# Patient Record
Sex: MALE | Race: WHITE | Employment: FULL TIME
[De-identification: names, ages, dates, MRNs, and addresses within clinical notes are randomized per-mention and may not be internally consistent; named-entity substitution may affect disease eponyms.]

---

## 2017-01-06 ENCOUNTER — OFFICE VISIT (OUTPATIENT)
Dept: FAMILY MEDICINE CLINIC | Facility: CLINIC | Age: 37
End: 2017-01-06

## 2017-01-06 VITALS
SYSTOLIC BLOOD PRESSURE: 130 MMHG | WEIGHT: 169 LBS | TEMPERATURE: 98 F | BODY MASS INDEX: 23.66 KG/M2 | HEIGHT: 71 IN | OXYGEN SATURATION: 97 % | HEART RATE: 100 BPM | DIASTOLIC BLOOD PRESSURE: 74 MMHG

## 2017-01-06 DIAGNOSIS — F51.01 PRIMARY INSOMNIA: ICD-10-CM

## 2017-01-06 DIAGNOSIS — F19.21 DRUG ADDICTION IN REMISSION (HCC): ICD-10-CM

## 2017-01-06 DIAGNOSIS — F41.1 GENERALIZED ANXIETY DISORDER: Primary | ICD-10-CM

## 2017-01-06 PROCEDURE — 99214 OFFICE O/P EST MOD 30 MIN: CPT | Performed by: NURSE PRACTITIONER

## 2017-01-06 RX ORDER — GABAPENTIN 800 MG/1
TABLET ORAL
Qty: 120 TABLET | Refills: 0 | Status: SHIPPED | OUTPATIENT
Start: 2017-01-06 | End: 2017-02-03 | Stop reason: SDUPTHER

## 2017-02-03 ENCOUNTER — OFFICE VISIT (OUTPATIENT)
Dept: FAMILY MEDICINE CLINIC | Facility: CLINIC | Age: 37
End: 2017-02-03

## 2017-02-03 VITALS
TEMPERATURE: 98.1 F | SYSTOLIC BLOOD PRESSURE: 122 MMHG | HEART RATE: 96 BPM | OXYGEN SATURATION: 98 % | HEIGHT: 71 IN | WEIGHT: 168 LBS | BODY MASS INDEX: 23.52 KG/M2 | DIASTOLIC BLOOD PRESSURE: 70 MMHG

## 2017-02-03 DIAGNOSIS — F41.1 GENERALIZED ANXIETY DISORDER: Primary | ICD-10-CM

## 2017-02-03 DIAGNOSIS — R36.9 DISCHARGE FROM PENIS: ICD-10-CM

## 2017-02-03 PROCEDURE — 99214 OFFICE O/P EST MOD 30 MIN: CPT | Performed by: NURSE PRACTITIONER

## 2017-02-03 RX ORDER — BUPRENORPHINE AND NALOXONE 8; 2 MG/1; MG/1
8 FILM, SOLUBLE BUCCAL; SUBLINGUAL 2 TIMES DAILY
COMMUNITY
End: 2017-06-15

## 2017-02-03 RX ORDER — GABAPENTIN 800 MG/1
TABLET ORAL
Qty: 120 TABLET | Refills: 0 | Status: SHIPPED | OUTPATIENT
Start: 2017-02-03 | End: 2017-03-01 | Stop reason: SDUPTHER

## 2017-02-06 RX ORDER — AZITHROMYCIN 500 MG/1
1000 TABLET, FILM COATED ORAL DAILY
Qty: 2 TABLET | Refills: 0 | Status: SHIPPED | OUTPATIENT
Start: 2017-02-06 | End: 2017-02-07 | Stop reason: ALTCHOICE

## 2017-03-01 ENCOUNTER — OFFICE VISIT (OUTPATIENT)
Dept: FAMILY MEDICINE CLINIC | Facility: CLINIC | Age: 37
End: 2017-03-01

## 2017-03-01 VITALS
HEIGHT: 71 IN | DIASTOLIC BLOOD PRESSURE: 60 MMHG | BODY MASS INDEX: 23.52 KG/M2 | WEIGHT: 168 LBS | HEART RATE: 78 BPM | SYSTOLIC BLOOD PRESSURE: 102 MMHG | OXYGEN SATURATION: 97 % | TEMPERATURE: 98.3 F

## 2017-03-01 DIAGNOSIS — F41.1 GENERALIZED ANXIETY DISORDER: Primary | ICD-10-CM

## 2017-03-01 DIAGNOSIS — F19.20 DRUG ABUSE AND DEPENDENCE (HCC): ICD-10-CM

## 2017-03-01 PROCEDURE — 99214 OFFICE O/P EST MOD 30 MIN: CPT | Performed by: NURSE PRACTITIONER

## 2017-03-01 RX ORDER — GABAPENTIN 800 MG/1
TABLET ORAL
Qty: 120 TABLET | Refills: 2 | Status: SHIPPED | OUTPATIENT
Start: 2017-03-01 | End: 2017-06-15 | Stop reason: SDUPTHER

## 2017-03-01 RX ORDER — CITALOPRAM 20 MG/1
20 TABLET ORAL DAILY
Qty: 30 TABLET | Refills: 3 | Status: SHIPPED | OUTPATIENT
Start: 2017-03-01 | End: 2017-04-04 | Stop reason: SDUPTHER

## 2017-03-01 ASSESSMENT — ENCOUNTER SYMPTOMS
VOMITING: 0
NAUSEA: 0
DIARRHEA: 0

## 2017-03-07 ENCOUNTER — TELEPHONE (OUTPATIENT)
Dept: FAMILY MEDICINE CLINIC | Facility: CLINIC | Age: 37
End: 2017-03-07

## 2017-03-07 ENCOUNTER — NURSE ONLY (OUTPATIENT)
Dept: FAMILY MEDICINE CLINIC | Facility: CLINIC | Age: 37
End: 2017-03-07

## 2017-03-07 DIAGNOSIS — Z20.2 EXPOSURE TO STD: Primary | ICD-10-CM

## 2017-03-07 DIAGNOSIS — R36.9 PENILE DISCHARGE: ICD-10-CM

## 2017-03-08 RX ORDER — SULFAMETHOXAZOLE AND TRIMETHOPRIM 800; 160 MG/1; MG/1
1 TABLET ORAL 2 TIMES DAILY
Qty: 6 TABLET | Refills: 0 | Status: SHIPPED | OUTPATIENT
Start: 2017-03-08 | End: 2017-03-11

## 2017-03-09 RX ORDER — AZITHROMYCIN 500 MG/1
1000 TABLET, FILM COATED ORAL DAILY
Qty: 2 TABLET | Refills: 0 | Status: SHIPPED | OUTPATIENT
Start: 2017-03-09 | End: 2017-03-10

## 2017-04-04 ENCOUNTER — OFFICE VISIT (OUTPATIENT)
Dept: FAMILY MEDICINE CLINIC | Age: 37
End: 2017-04-04
Payer: MEDICAID

## 2017-04-04 VITALS
OXYGEN SATURATION: 98 % | DIASTOLIC BLOOD PRESSURE: 70 MMHG | HEART RATE: 76 BPM | BODY MASS INDEX: 24.22 KG/M2 | WEIGHT: 173 LBS | HEIGHT: 71 IN | TEMPERATURE: 98.7 F | SYSTOLIC BLOOD PRESSURE: 116 MMHG

## 2017-04-04 DIAGNOSIS — F41.9 ANXIETY AND DEPRESSION: Primary | ICD-10-CM

## 2017-04-04 DIAGNOSIS — F32.A ANXIETY AND DEPRESSION: Primary | ICD-10-CM

## 2017-04-04 DIAGNOSIS — F51.04 PSYCHOPHYSIOLOGICAL INSOMNIA: ICD-10-CM

## 2017-04-04 DIAGNOSIS — F19.20 DRUG ABUSE AND DEPENDENCE (HCC): ICD-10-CM

## 2017-04-04 PROCEDURE — 99214 OFFICE O/P EST MOD 30 MIN: CPT | Performed by: NURSE PRACTITIONER

## 2017-04-04 RX ORDER — CITALOPRAM 40 MG/1
40 TABLET ORAL DAILY
Qty: 30 TABLET | Refills: 0 | Status: SHIPPED | OUTPATIENT
Start: 2017-04-04 | End: 2017-06-15 | Stop reason: SDUPTHER

## 2017-04-04 RX ORDER — HYDROXYZINE 50 MG/1
50 TABLET, FILM COATED ORAL NIGHTLY PRN
Qty: 30 TABLET | Refills: 0 | Status: SHIPPED | OUTPATIENT
Start: 2017-04-04 | End: 2017-05-11 | Stop reason: SDUPTHER

## 2017-05-10 ENCOUNTER — TELEPHONE (OUTPATIENT)
Dept: FAMILY MEDICINE CLINIC | Age: 37
End: 2017-05-10

## 2017-05-11 RX ORDER — HYDROXYZINE 50 MG/1
50 TABLET, FILM COATED ORAL NIGHTLY PRN
Qty: 30 TABLET | Refills: 0 | Status: SHIPPED | OUTPATIENT
Start: 2017-05-11 | End: 2017-05-21

## 2017-06-15 ENCOUNTER — OFFICE VISIT (OUTPATIENT)
Dept: FAMILY MEDICINE CLINIC | Age: 37
End: 2017-06-15
Payer: MEDICAID

## 2017-06-15 VITALS
OXYGEN SATURATION: 98 % | SYSTOLIC BLOOD PRESSURE: 124 MMHG | DIASTOLIC BLOOD PRESSURE: 80 MMHG | HEART RATE: 88 BPM | TEMPERATURE: 99 F | BODY MASS INDEX: 23.71 KG/M2 | WEIGHT: 170 LBS

## 2017-06-15 DIAGNOSIS — F41.1 GENERALIZED ANXIETY DISORDER: Primary | ICD-10-CM

## 2017-06-15 DIAGNOSIS — G25.81 RLS (RESTLESS LEGS SYNDROME): ICD-10-CM

## 2017-06-15 PROCEDURE — 99214 OFFICE O/P EST MOD 30 MIN: CPT | Performed by: NURSE PRACTITIONER

## 2017-06-15 RX ORDER — GABAPENTIN 800 MG/1
TABLET ORAL
Qty: 120 TABLET | Refills: 3 | Status: SHIPPED | OUTPATIENT
Start: 2017-06-15 | End: 2017-09-08

## 2017-06-15 RX ORDER — CITALOPRAM 40 MG/1
40 TABLET ORAL DAILY
Qty: 30 TABLET | Refills: 3 | Status: SHIPPED | OUTPATIENT
Start: 2017-06-15 | End: 2017-12-02 | Stop reason: ALTCHOICE

## 2017-06-15 ASSESSMENT — PATIENT HEALTH QUESTIONNAIRE - PHQ9
2. FEELING DOWN, DEPRESSED OR HOPELESS: 0
SUM OF ALL RESPONSES TO PHQ9 QUESTIONS 1 & 2: 1
1. LITTLE INTEREST OR PLEASURE IN DOING THINGS: 1
SUM OF ALL RESPONSES TO PHQ QUESTIONS 1-9: 1

## 2017-06-15 ASSESSMENT — ENCOUNTER SYMPTOMS
COUGH: 1
SPUTUM PRODUCTION: 0

## 2017-08-01 ENCOUNTER — HOSPITAL ENCOUNTER (EMERGENCY)
Age: 37
Discharge: HOME | End: 2017-08-01
Attending: EMERGENCY MEDICINE
Payer: MEDICAID

## 2017-08-01 VITALS
RESPIRATION RATE: 16 BRPM | HEART RATE: 76 BPM | DIASTOLIC BLOOD PRESSURE: 76 MMHG | TEMPERATURE: 98.7 F | OXYGEN SATURATION: 100 % | SYSTOLIC BLOOD PRESSURE: 121 MMHG

## 2017-08-01 DIAGNOSIS — T40.2X1A OPIOID OVERDOSE, ACCIDENTAL OR UNINTENTIONAL, INITIAL ENCOUNTER (HCC): Primary | ICD-10-CM

## 2017-08-01 LAB
ABSOLUTE EOS #: 0.1 K/UL (ref 0–0.4)
ABSOLUTE LYMPH #: 3.2 K/UL (ref 0.9–2.5)
ABSOLUTE MONO #: 0.7 K/UL (ref 0–1)
ALBUMIN SERPL-MCNC: 4.1 G/DL (ref 3.5–5.2)
ALBUMIN/GLOBULIN RATIO: ABNORMAL (ref 1–2.5)
ALP BLD-CCNC: 77 U/L (ref 40–129)
ALT SERPL-CCNC: 60 U/L (ref 5–41)
ANION GAP SERPL CALCULATED.3IONS-SCNC: 12 MMOL/L (ref 9–17)
AST SERPL-CCNC: 61 U/L
BASOPHILS # BLD: 1 %
BASOPHILS ABSOLUTE: 0.1 K/UL (ref 0–0.2)
BILIRUB SERPL-MCNC: 0.5 MG/DL (ref 0.3–1.2)
BUN BLDV-MCNC: 16 MG/DL (ref 6–20)
BUN/CREAT BLD: 20 (ref 9–20)
CALCIUM SERPL-MCNC: 9.1 MG/DL (ref 8.6–10.4)
CHLORIDE BLD-SCNC: 102 MMOL/L (ref 98–107)
CO2: 25 MMOL/L (ref 20–31)
CREAT SERPL-MCNC: 0.82 MG/DL (ref 0.7–1.2)
DIFFERENTIAL TYPE: YES
EOSINOPHILS RELATIVE PERCENT: 1 %
ETHANOL PERCENT: <0.01 %
ETHANOL: <10 MG/DL
GFR AFRICAN AMERICAN: >60 ML/MIN
GFR NON-AFRICAN AMERICAN: >60 ML/MIN
GFR SERPL CREATININE-BSD FRML MDRD: ABNORMAL ML/MIN/{1.73_M2}
GFR SERPL CREATININE-BSD FRML MDRD: ABNORMAL ML/MIN/{1.73_M2}
GLUCOSE BLD-MCNC: 97 MG/DL (ref 70–99)
HCT VFR BLD CALC: 40.9 % (ref 41–53)
HEMOGLOBIN: 13.7 G/DL (ref 13.5–17.5)
LYMPHOCYTES # BLD: 34 %
MCH RBC QN AUTO: 28 PG (ref 26–34)
MCHC RBC AUTO-ENTMCNC: 33.5 G/DL (ref 31–37)
MCV RBC AUTO: 83.4 FL (ref 80–100)
MONOCYTES # BLD: 8 %
PDW BLD-RTO: 13.9 % (ref 12.1–15.2)
PLATELET # BLD: 113 K/UL (ref 140–450)
PLATELET ESTIMATE: ABNORMAL
PMV BLD AUTO: ABNORMAL FL (ref 6–12)
POTASSIUM SERPL-SCNC: 3.5 MMOL/L (ref 3.7–5.3)
RBC # BLD: 4.9 M/UL (ref 4.5–5.9)
RBC # BLD: ABNORMAL 10*6/UL
SEG NEUTROPHILS: 56 %
SEGMENTED NEUTROPHILS ABSOLUTE COUNT: 5.2 K/UL (ref 2.1–6.5)
SODIUM BLD-SCNC: 139 MMOL/L (ref 135–144)
TOTAL PROTEIN: 7 G/DL (ref 6.4–8.3)
WBC # BLD: 9.3 K/UL (ref 3.5–11)
WBC # BLD: ABNORMAL 10*3/UL

## 2017-08-01 PROCEDURE — G0480 DRUG TEST DEF 1-7 CLASSES: HCPCS

## 2017-08-01 PROCEDURE — 36415 COLL VENOUS BLD VENIPUNCTURE: CPT

## 2017-08-01 PROCEDURE — 99285 EMERGENCY DEPT VISIT HI MDM: CPT

## 2017-08-01 PROCEDURE — 80053 COMPREHEN METABOLIC PANEL: CPT

## 2017-08-01 PROCEDURE — 96374 THER/PROPH/DIAG INJ IV PUSH: CPT

## 2017-08-01 PROCEDURE — 93005 ELECTROCARDIOGRAM TRACING: CPT

## 2017-08-01 PROCEDURE — 6360000002 HC RX W HCPCS

## 2017-08-01 PROCEDURE — 6360000002 HC RX W HCPCS: Performed by: EMERGENCY MEDICINE

## 2017-08-01 PROCEDURE — 96376 TX/PRO/DX INJ SAME DRUG ADON: CPT

## 2017-08-01 PROCEDURE — 2580000003 HC RX 258: Performed by: EMERGENCY MEDICINE

## 2017-08-01 PROCEDURE — 85025 COMPLETE CBC W/AUTO DIFF WBC: CPT

## 2017-08-01 RX ORDER — NALOXONE HYDROCHLORIDE 1 MG/ML
INJECTION INTRAMUSCULAR; INTRAVENOUS; SUBCUTANEOUS
Status: COMPLETED
Start: 2017-08-01 | End: 2017-08-01

## 2017-08-01 RX ORDER — NALOXONE HYDROCHLORIDE 1 MG/ML
0.4 INJECTION INTRAMUSCULAR; INTRAVENOUS; SUBCUTANEOUS ONCE
Status: COMPLETED | OUTPATIENT
Start: 2017-08-01 | End: 2017-08-01

## 2017-08-01 RX ORDER — NALOXONE HYDROCHLORIDE 1 MG/ML
1 INJECTION INTRAMUSCULAR; INTRAVENOUS; SUBCUTANEOUS ONCE
Status: COMPLETED | OUTPATIENT
Start: 2017-08-01 | End: 2017-08-01

## 2017-08-01 RX ORDER — NALOXONE HYDROCHLORIDE 1 MG/ML
2 INJECTION INTRAMUSCULAR; INTRAVENOUS; SUBCUTANEOUS ONCE
Status: COMPLETED | OUTPATIENT
Start: 2017-08-01 | End: 2017-08-01

## 2017-08-01 RX ORDER — 0.9 % SODIUM CHLORIDE 0.9 %
1000 INTRAVENOUS SOLUTION INTRAVENOUS ONCE
Status: COMPLETED | OUTPATIENT
Start: 2017-08-01 | End: 2017-08-01

## 2017-08-01 RX ADMIN — NALOXONE HYDROCHLORIDE 1 MG: 1 INJECTION INTRAMUSCULAR; INTRAVENOUS; SUBCUTANEOUS at 08:09

## 2017-08-01 RX ADMIN — NALOXONE HYDROCHLORIDE 1 MG: 1 INJECTION PARENTERAL at 08:09

## 2017-08-01 RX ADMIN — NALOXONE HYDROCHLORIDE 2 MG: 1 INJECTION PARENTERAL at 10:32

## 2017-08-01 RX ADMIN — NALOXONE HYDROCHLORIDE: 1 INJECTION PARENTERAL at 08:16

## 2017-08-01 RX ADMIN — SODIUM CHLORIDE 1000 ML: 9 INJECTION, SOLUTION INTRAVENOUS at 08:27

## 2017-08-01 RX ADMIN — NALOXONE HYDROCHLORIDE 2 MG: 1 INJECTION INTRAMUSCULAR; INTRAVENOUS; SUBCUTANEOUS at 10:32

## 2017-08-01 RX ADMIN — NALOXONE HYDROCHLORIDE 0.4 MG: 1 INJECTION PARENTERAL at 08:20

## 2017-08-01 RX ADMIN — NALOXONE HYDROCHLORIDE 1 MG: 1 INJECTION PARENTERAL at 08:57

## 2017-08-01 RX ADMIN — NALOXONE HYDROCHLORIDE: 1 INJECTION INTRAMUSCULAR; INTRAVENOUS; SUBCUTANEOUS at 08:16

## 2017-08-02 LAB
EKG ATRIAL RATE: 80 BPM
EKG P AXIS: 62 DEGREES
EKG P-R INTERVAL: 148 MS
EKG Q-T INTERVAL: 390 MS
EKG QRS DURATION: 102 MS
EKG QTC CALCULATION (BAZETT): 449 MS
EKG R AXIS: 31 DEGREES
EKG T AXIS: 47 DEGREES
EKG VENTRICULAR RATE: 80 BPM

## 2017-08-10 ENCOUNTER — TELEPHONE (OUTPATIENT)
Dept: FAMILY MEDICINE CLINIC | Age: 37
End: 2017-08-10

## 2017-08-18 ENCOUNTER — TELEPHONE (OUTPATIENT)
Dept: FAMILY MEDICINE CLINIC | Age: 37
End: 2017-08-18

## 2017-08-19 ENCOUNTER — HOSPITAL ENCOUNTER (EMERGENCY)
Age: 37
Discharge: HOME | End: 2017-08-19
Attending: FAMILY MEDICINE
Payer: MEDICAID

## 2017-08-19 VITALS
SYSTOLIC BLOOD PRESSURE: 109 MMHG | TEMPERATURE: 98.3 F | HEART RATE: 83 BPM | RESPIRATION RATE: 16 BRPM | DIASTOLIC BLOOD PRESSURE: 87 MMHG | OXYGEN SATURATION: 100 %

## 2017-08-19 DIAGNOSIS — G25.81 RESTLESS LEG SYNDROME: Primary | ICD-10-CM

## 2017-08-19 PROCEDURE — 99281 EMR DPT VST MAYX REQ PHY/QHP: CPT

## 2017-08-19 RX ORDER — BUPRENORPHINE AND NALOXONE 8; 2 MG/1; MG/1
1 FILM, SOLUBLE BUCCAL; SUBLINGUAL 2 TIMES DAILY
COMMUNITY
End: 2018-04-12 | Stop reason: ALTCHOICE

## 2017-08-19 RX ORDER — ROPINIROLE 0.25 MG/1
TABLET, FILM COATED ORAL
Qty: 15 TABLET | Refills: 0 | Status: SHIPPED | OUTPATIENT
Start: 2017-08-19 | End: 2017-12-02 | Stop reason: ALTCHOICE

## 2017-09-01 ENCOUNTER — HOSPITAL ENCOUNTER (EMERGENCY)
Age: 37
Discharge: HOME | End: 2017-09-01
Attending: EMERGENCY MEDICINE
Payer: MEDICAID

## 2017-09-01 VITALS
DIASTOLIC BLOOD PRESSURE: 63 MMHG | TEMPERATURE: 98.3 F | RESPIRATION RATE: 16 BRPM | OXYGEN SATURATION: 99 % | HEART RATE: 94 BPM | SYSTOLIC BLOOD PRESSURE: 116 MMHG

## 2017-09-01 DIAGNOSIS — K08.89 TOOTHACHE: Primary | ICD-10-CM

## 2017-09-01 PROCEDURE — 99282 EMERGENCY DEPT VISIT SF MDM: CPT

## 2017-09-01 PROCEDURE — 6370000000 HC RX 637 (ALT 250 FOR IP): Performed by: EMERGENCY MEDICINE

## 2017-09-01 RX ORDER — NAPROXEN 375 MG/1
375 TABLET ORAL 2 TIMES DAILY
Qty: 6 TABLET | Refills: 0 | Status: SHIPPED | OUTPATIENT
Start: 2017-09-01 | End: 2017-12-02

## 2017-09-01 RX ORDER — PENICILLIN V POTASSIUM 500 MG/1
500 TABLET ORAL 3 TIMES DAILY
Qty: 30 TABLET | Refills: 0 | Status: SHIPPED | OUTPATIENT
Start: 2017-09-01 | End: 2017-09-11

## 2017-09-01 RX ORDER — PENICILLIN V POTASSIUM 250 MG/1
500 TABLET ORAL ONCE
Status: COMPLETED | OUTPATIENT
Start: 2017-09-01 | End: 2017-09-01

## 2017-09-01 RX ORDER — ACETAMINOPHEN 500 MG
1000 TABLET ORAL EVERY 6 HOURS PRN
COMMUNITY
End: 2018-09-29 | Stop reason: ALTCHOICE

## 2017-09-01 RX ADMIN — PENICILLIN V POTASIUM 500 MG: 250 TABLET ORAL at 13:25

## 2017-09-01 ASSESSMENT — PAIN SCALES - GENERAL: PAINLEVEL_OUTOF10: 10

## 2017-09-01 ASSESSMENT — ENCOUNTER SYMPTOMS
TROUBLE SWALLOWING: 0
FACIAL SWELLING: 1
GASTROINTESTINAL NEGATIVE: 1
TRISMUS: 0
RHINORRHEA: 0
SORE THROAT: 0
RESPIRATORY NEGATIVE: 1
EYES NEGATIVE: 1
VOICE CHANGE: 0
ALLERGIC/IMMUNOLOGIC NEGATIVE: 1
SINUS PRESSURE: 0

## 2017-09-01 ASSESSMENT — PAIN DESCRIPTION - LOCATION: LOCATION: TEETH

## 2017-09-01 ASSESSMENT — PAIN DESCRIPTION - PAIN TYPE: TYPE: ACUTE PAIN

## 2017-09-01 ASSESSMENT — PAIN DESCRIPTION - DESCRIPTORS: DESCRIPTORS: THROBBING

## 2017-09-01 ASSESSMENT — PAIN DESCRIPTION - ORIENTATION: ORIENTATION: LEFT;UPPER

## 2017-09-08 ENCOUNTER — HOSPITAL ENCOUNTER (EMERGENCY)
Age: 37
Discharge: HOME | End: 2017-09-08
Attending: FAMILY MEDICINE
Payer: MEDICAID

## 2017-09-08 VITALS
OXYGEN SATURATION: 97 % | HEART RATE: 98 BPM | DIASTOLIC BLOOD PRESSURE: 77 MMHG | RESPIRATION RATE: 18 BRPM | SYSTOLIC BLOOD PRESSURE: 130 MMHG | TEMPERATURE: 98.7 F

## 2017-09-08 DIAGNOSIS — G89.4 CHRONIC PAIN ASSOCIATED WITH SIGNIFICANT PSYCHOSOCIAL DYSFUNCTION: Primary | ICD-10-CM

## 2017-09-08 PROCEDURE — 99281 EMR DPT VST MAYX REQ PHY/QHP: CPT

## 2017-09-08 RX ORDER — GABAPENTIN 400 MG/1
400 CAPSULE ORAL 4 TIMES DAILY
Qty: 28 CAPSULE | Refills: 0 | Status: SHIPPED | OUTPATIENT
Start: 2017-09-08 | End: 2018-04-12 | Stop reason: ALTCHOICE

## 2017-09-27 ENCOUNTER — HOSPITAL ENCOUNTER (EMERGENCY)
Age: 37
Discharge: HOME | End: 2017-09-27
Attending: EMERGENCY MEDICINE
Payer: MEDICAID

## 2017-09-27 VITALS
TEMPERATURE: 97.9 F | HEART RATE: 60 BPM | RESPIRATION RATE: 18 BRPM | DIASTOLIC BLOOD PRESSURE: 78 MMHG | SYSTOLIC BLOOD PRESSURE: 116 MMHG | OXYGEN SATURATION: 100 %

## 2017-09-27 DIAGNOSIS — R11.15 NON-INTRACTABLE CYCLICAL VOMITING WITHOUT NAUSEA: Primary | ICD-10-CM

## 2017-09-27 PROCEDURE — 6360000002 HC RX W HCPCS: Performed by: EMERGENCY MEDICINE

## 2017-09-27 PROCEDURE — 99283 EMERGENCY DEPT VISIT LOW MDM: CPT

## 2017-09-27 RX ORDER — ONDANSETRON 4 MG/1
4 TABLET, ORALLY DISINTEGRATING ORAL EVERY 8 HOURS PRN
Qty: 6 TABLET | Refills: 0 | Status: SHIPPED | OUTPATIENT
Start: 2017-09-27 | End: 2017-09-29

## 2017-09-27 RX ORDER — ONDANSETRON 4 MG/1
4 TABLET, ORALLY DISINTEGRATING ORAL ONCE
Status: COMPLETED | OUTPATIENT
Start: 2017-09-27 | End: 2017-09-27

## 2017-09-27 RX ADMIN — ONDANSETRON 4 MG: 4 TABLET, ORALLY DISINTEGRATING ORAL at 14:53

## 2017-09-27 ASSESSMENT — ENCOUNTER SYMPTOMS
ALLERGIC/IMMUNOLOGIC NEGATIVE: 1
GASTROINTESTINAL NEGATIVE: 1
HEMATOCHEZIA: 0
DIARRHEA: 0
RESPIRATORY NEGATIVE: 1
SHORTNESS OF BREATH: 0
NAUSEA: 0
COUGH: 0
EYES NEGATIVE: 1
ABDOMINAL PAIN: 0

## 2017-09-27 NOTE — ED NOTES
IV attempted by [facility] and patient refuses to have IV in ER. Dr. Nolan Braxton made aware that patient does not want IV.        Refugio Zhang RN  09/27/17 5095

## 2017-09-27 NOTE — ED PROVIDER NOTES
Patient is a 44 y.o. male presenting with fatigue. The history is provided by the patient. Fatigue   Severity:  Mild  Onset quality:  Gradual  Duration:  24 hours  Timing:  Constant  Progression:  Unchanged  Chronicity:  New  Context: dehydration    Context: not alcohol use, not allergies, not change in medication, not decreased sleep, not drug use, not increased activity, not pinched nerve, not recent infection, not stress and not urinary tract infection    Relieved by:  Nothing  Worsened by:  Nothing  Ineffective treatments:  None tried  Associated symptoms: no abdominal pain, no anorexia, no aphasia, no arthralgias, no ataxia, no chest pain, no cough, no diarrhea, no difficulty walking, no dizziness, no drooling, no dysphagia, no dysuria, no numbness in extremities, no falls, no fever, no foul-smelling urine, no frequency, no headaches, no hematochezia, no lethargy, no loss of consciousness, no melena, no myalgias, no nausea, no near-syncope, no seizures, no sensory-motor deficit, no shortness of breath, no stroke symptoms, no syncope and no urgency    Risk factors: no anemia, no congestive heart failure, no coronary artery disease, no diabetes, no excessive menstruation, no family hx of stroke, no heart disease, no neurologic disease, no new medications and no recent stressors        Review of Systems   Constitutional: Positive for fatigue. Negative for activity change, appetite change, chills, diaphoresis, fever and unexpected weight change. HENT: Negative. Negative for drooling. Eyes: Negative. Respiratory: Negative. Negative for cough and shortness of breath. Cardiovascular: Negative. Negative for chest pain, syncope and near-syncope. Gastrointestinal: Negative. Negative for abdominal pain, anorexia, diarrhea, dysphagia, hematochezia, melena and nausea. Endocrine: Negative. Genitourinary: Negative. Negative for dysuria, frequency and urgency. Musculoskeletal: Negative.   Negative

## 2017-09-27 NOTE — ED NOTES
Pt already has a family doctor in [address] [place] [address] (Dr. Ashley Sharma)     Renetta Marshall RN  09/27/17 [id number]

## 2017-09-27 NOTE — ED AVS SNAPSHOT
After Visit Summary  (Discharge Instructions)    Medication List for Home    Based on the information you provided to us as well as any changes during this visit, the following is your updated medication list.  Compare this with your prescription bottles at home. If you have any questions or concerns, contact your primary care physician's office. Daily Medication List (This medication list can be shared with any Healthcare provider who is helping you manage your medications)      There are NEW medications for you. START taking them after you leave the hospital     ondansetron 4 MG disintegrating tablet   Commonly known as:  ZOFRAN ODT   Take 1 tablet by mouth every 8 hours as needed for Nausea or Vomiting         ASK your doctor about these medications if you have questions     acetaminophen 500 MG tablet   Commonly known as:  TYLENOL   Take 1,000 mg by mouth every 6 hours as needed for Pain       citalopram 40 MG tablet   Commonly known as:  CELEXA   Take 1 tablet by mouth daily       gabapentin 400 MG capsule   Commonly known as:  NEURONTIN   Take 1 capsule by mouth 4 times daily       naproxen 375 MG tablet   Commonly known as:  NAPROSYN   Take 1 tablet by mouth 2 times daily for 3 days       rOPINIRole 0.25 MG tablet   Commonly known as:  REQUIP   Take one tablet 1-3 hours before bedtime. SUBOXONE 8-2 MG Film SL film   Generic drug:  buprenorphine-naloxone   Place 1 Film under the tongue 2 times daily            Where to Get Your Medications      You can get these medications from any pharmacy     Bring a paper prescription for each of these medications     ondansetron 4 MG disintegrating tablet               Allergies as of 9/27/2017     No Known Allergies      Immunizations as of 9/27/2017     No immunizations on file. After Visit Summary    This summary was created for you. Thank you for entrusting your care to us.   The following information includes details about your hospital/visit stay along with steps you should take to help with your recovery once you leave the hospital.  In this packet, you will find information about the topics listed below:    · Instructions about your medications including a list of your home medications  · A summary of your hospital visit  · Follow-up appointments once you have left the hospital  · Your care plan at home      You may receive a survey regarding the care you received during your stay. Your input is valuable to us. We encourage you to complete and return your survey in the envelope provided. We hope you will choose us in the future for your healthcare needs. Patient Information     Patient Name DION Hardy 1980      Care Provided at:     Name Address Phone       [address] [phone]            Your Visit    Here you will find information about your visit, including the reason for your visit. Please take this sheet with you when you visit your doctor or other health care provider in the future. It will help determine the best possible medical care for you at that time. If you have any questions once you leave the hospital, please call the department phone number listed below. Diagnoses this visit     Your diagnosis was NON-INTRACTABLE CYCLICAL VOMITING WITHOUT NAUSEA. Visit Information     Date of Visit Department Dept Phone    2017 [facility] [phone]      You were seen by     You were seen by Becky Mayfield MD.       Follow-up Appointments    Below is a list of your follow-up and future appointments. This may not be a complete list as you may have made appointments directly with providers that we are not aware of or your providers may have made some for you. Please call your providers to confirm appointments. It is important to keep your appointments.  Please bring your current insurance card, photo ID, co-pay, and all medication bottles to your appointment. If self-pay, payment is expected at the time of service. Follow-up Information     Follow up with [facility]. Call in 1 week. Specialty:  Emergency Medicine    Contact information:    [address] [place] [id number]  [phone]      Preventive Care        Date Due    HIV screening is recommended for all people regardless of risk factors  aged 15-65 years at least once (lifetime) who have never been HIV tested. 2/28/1994    Tetanus Combination Vaccine (1 - Tdap) 2/28/1998    Pneumococcal Vaccine - Pneumovax for adults aged 19-64 years with: chronic heart disease, chronic lung disease, diabetes mellitus, alcoholism, chronic liver disease, or cigarette smoking. (1 of 1 - PPSV23) 2/28/1998    Yearly Flu Vaccine (1) 9/1/2017                 Care Plan Once You Return Home    This section includes instructions you will need to follow once you leave the hospital.  Your care team will discuss these with you, so you and those caring for you know how to best care for your health needs at home. This section may also include educational information about certain health topics that may be of help to you. Important Information if you smoke or are exposed to smoking       SMOKING: QUIT SMOKING. THIS IS THE MOST IMPORTANT ACTION YOU CAN TAKE TO IMPROVE YOUR CURRENT AND FUTURE HEALTH. Call the [facility] [place] NOW ([phone])    Smoking harms nonsmokers. When nonsmokers are around people who smoke, they absorb nicotine, carbon monoxide, and other ingredients of tobacco smoke. DO NOT SMOKE AROUND CHILDREN     Children exposed to secondhand smoke are at an increased risk of:  Sudden Infant Death Syndrome (SIDS), acute respiratory infections, inflammation of the middle ear, and severe asthma.   Over a longer time, it causes heart disease and lung cancer. There is no safe level of exposure to secondhand smoke. Important information for a smoker       SMOKING: QUIT SMOKING. THIS IS THE MOST IMPORTANT ACTION YOU CAN TAKE TO IMPROVE YOUR CURRENT AND FUTURE HEALTH. Call the [facility] [place] NOW ([phone])    Smoking harms nonsmokers. When nonsmokers are around people who smoke, they absorb nicotine, carbon monoxide, and other ingredients of tobacco smoke. DO NOT SMOKE AROUND CHILDREN     Children exposed to secondhand smoke are at an increased risk of:  Sudden Infant Death Syndrome (SIDS), acute respiratory infections, inflammation of the middle ear, and severe asthma. Over a longer time, it causes heart disease and lung cancer. There is no safe level of exposure to secondhand smoke. [employer] Signup     [employer] allows you to send messages to your doctor, view your test results, renew your prescriptions, schedule appointments, view visit notes, and more. How Do I Sign Up? 1. In your Internet browser, go to [email or web address]  2. Click on the Sign Up Now link in the Sign In box. You will see the New Member Sign Up page. 3. Enter your [employer] Access Code exactly as it appears below. You will not need to use this code after youve completed the sign-up process. If you do not sign up before the expiration date, you must request a new code. [employer] Access Code: [email or web address]  Expires: 9/30/2017 11:57 AM    4. Enter your Social Security Number ([id number]) and Date of Birth (mm/dd/yyyy) as indicated and click Submit. You will be taken to the next sign-up page. 5. Create a Sw[employer] ID. This will be your [employer] login ID and cannot be changed, so think of one that is secure and easy to remember. 6. Create a [employer] password. You can change your password at any time. 7. Enter your Password Reset Question and Answer.  This can be used at a later time if you forget your password. 8. Enter your e-mail address. You will receive e-mail notification when new information is available in [address] 9. Click Sign Up. You can now view your medical record. Additional Information  If you have questions, please contact the physician practice where you receive care. Remember, MyChart is NOT to be used for urgent needs. For medical emergencies, dial 911. For questions regarding your MyChart account call [phone]. If you have a clinical question, please call your doctor's office. View your information online  ? Review your current list of  medications, immunization, and allergies. ? Review your future test results online . ? Review your discharge instructions provided by your caregivers at discharge    Certain functionality such as prescription refills, scheduling appointments or sending messages to your provider are not activated if your provider does not use [employer] in his/her office    For questions regarding your MyChart account call [phone]. If you have a clinical question, please call your doctor's office. The information on all pages of the After Visit Summary has been reviewed with me, the patient and/or responsible adult, by my health care provider(s). I had the opportunity to ask questions regarding this information. I understand I should dispose of my armband safely at home to protect my health information. A complete copy of the After Visit Summary has been given to me, the patient and/or responsible adult. Patient Signature/Responsible Adult: ___________________________________    Nurse Signature: ___________________________________  Resident/MLP Signature: ___________________________________  Attending Signature: ___________________________________    Date:____________Time:____________              Discharge Instructions            Learning About Cyclic Vomiting Syndrome  What is it? An adult or child who has cyclic vomiting syndrome (CVS) has repeated bouts of severe vomiting and nausea. Between the vomiting episodes, the person's health is normal. The cause of CVS isn't known, but it may be related to migraine headaches. What happens when you have CVS?  CVS causes severe nausea, vomiting, and drooling. You may not be able to walk or talk during an episode. You may look pale. You may have a fever and feel very tired and thirsty. Some people with CVS have belly pain and diarrhea. Bouts of vomiting can last for a few hours or a few days. People with this condition tend to have a typical pattern of attacks. For example, one person may have bouts of CVS 4 times a year and always in the morning. Another person may have 8 bouts a year and always in the evening. Some people with CVS have triggers that set off the vomiting. People have reported an infection, such as a cold, as a trigger. Other triggers include stress, menstrual cycles, and certain foods like chocolate or cheese. Children tend to have more triggers than adults do. How is CVS treated? Treatment is centered around easing the nausea and vomiting. The doctor may prescribe medicine. During very bad bouts of vomiting, your doctor may want you to stay in the hospital for a while. You may get fluids through a vein (IV) to prevent dehydration. Dehydration can be serious. Your body needs fluids to make enough blood. Without a good supply of blood, vital organs such as the heart and brain can't work as well as they should. When should you call for help? Call your doctor now or seek immediate medical care if:  · You have symptoms of dehydration, such as:  ¨ Dry eyes and a dry mouth. ¨ Passing only a little urine. ¨ Feeling thirstier than usual.  Watch closely for changes in your health, and be sure to contact your doctor if:  · You do not get better as expected. Follow-up care is a key part of your treatment and safety.  Be sure to make

## 2017-12-02 ENCOUNTER — HOSPITAL ENCOUNTER (EMERGENCY)
Age: 37
Discharge: HOME | End: 2017-12-02
Attending: FAMILY MEDICINE
Payer: MEDICAID

## 2017-12-02 VITALS
TEMPERATURE: 98.4 F | WEIGHT: 175 LBS | BODY MASS INDEX: 24.5 KG/M2 | OXYGEN SATURATION: 99 % | DIASTOLIC BLOOD PRESSURE: 80 MMHG | SYSTOLIC BLOOD PRESSURE: 124 MMHG | HEART RATE: 97 BPM | RESPIRATION RATE: 16 BRPM | HEIGHT: 71 IN

## 2017-12-02 DIAGNOSIS — K04.7 DENTAL ABSCESS: Primary | ICD-10-CM

## 2017-12-02 PROCEDURE — 99282 EMERGENCY DEPT VISIT SF MDM: CPT

## 2017-12-02 RX ORDER — CLINDAMYCIN HYDROCHLORIDE 300 MG/1
300 CAPSULE ORAL 4 TIMES DAILY
Qty: 40 CAPSULE | Refills: 0 | Status: SHIPPED | OUTPATIENT
Start: 2017-12-02 | End: 2017-12-12

## 2017-12-02 RX ORDER — BUPROPION HYDROCHLORIDE 100 MG/1
100 TABLET ORAL 2 TIMES DAILY
COMMUNITY
End: 2018-04-12 | Stop reason: ALTCHOICE

## 2017-12-02 RX ORDER — CLOVE OIL
OIL (ML) MISCELLANEOUS 2 TIMES DAILY PRN
Qty: 1 BOTTLE | Refills: 0 | Status: SHIPPED | OUTPATIENT
Start: 2017-12-02 | End: 2018-04-12 | Stop reason: ALTCHOICE

## 2017-12-02 RX ORDER — NAPROXEN 500 MG/1
500 TABLET ORAL 2 TIMES DAILY
Qty: 60 TABLET | Refills: 0 | Status: SHIPPED | OUTPATIENT
Start: 2017-12-02 | End: 2018-04-28

## 2017-12-02 ASSESSMENT — PAIN SCALES - GENERAL: PAINLEVEL_OUTOF10: 7

## 2017-12-02 NOTE — ED PROVIDER NOTES
Alcohol use No    Drug use: No      Comment: Clean since June 2016    Sexual activity: Not Asked     Other Topics Concern    None     Social History Narrative    None       PHYSICAL EXAM    VITAL SIGNS: /80   Pulse 97   Temp 98.4 °F (36.9 °C) (Oral)   Resp 16   Ht 5' 11\" (1.803 m)   Wt 175 lb (79.4 kg)   SpO2 99%   BMI 24.41 kg/m²   Constitutional:  Well developed, well nourished, no acute distress, non-toxic appearance   Eyes: Pupils equally round and reactive to light. Extraocular motions intact. Motor and sensory function normal.  HENT: Atraumatic and normocephalic. TMs are normal nose is normal throat is normal.  His left upper wisdom tooth is broken off and carious. There is erythema around the gum. Respiratory:  No respiratory distress, normal breath sounds   Cardiovascular:  Normal rate, normal rhythm, no murmurs, no gallops, no rubs   Musculoskeletal:  No edema, no tenderness, no deformities. Back- no tenderness   Integument:  Well hydrated, no rash   Neurologic: Cranial nerves II through XII are normal.  Motor and sensory function intact. EKG        RADIOLOGY  No orders to display       Labs  Labs Reviewed - No data to display    PROCEDURES          Summation      Patient Course: 59-year-old male who is a drug addict on Suboxone. He is having pain in his left upper wisdom tooth. Examination reveals a broken off tooth with deep cavities. There is some erythematous swelling around the tooth consistent with possible abscess. He is treated with clindamycin, Naprosyn and clove oil and recommended to get this tooth pulled as soon as possible.     ED Medications administered this visit:  Medications - No data to display    New Prescriptions from this visit:    New Prescriptions    CLINDAMYCIN (CLEOCIN) 300 MG CAPSULE    Take 1 capsule by mouth 4 times daily for 10 days    CLOVE OIL LIQUID    Apply topically 2 times daily as needed (tooth pain)    NAPROXEN (NAPROSYN) 500 MG TABLET

## 2018-04-12 ENCOUNTER — HOSPITAL ENCOUNTER (EMERGENCY)
Age: 38
Discharge: HOME | End: 2018-04-12
Attending: EMERGENCY MEDICINE
Payer: COMMERCIAL

## 2018-04-12 VITALS
WEIGHT: 165 LBS | TEMPERATURE: 98.6 F | BODY MASS INDEX: 23.01 KG/M2 | RESPIRATION RATE: 20 BRPM | SYSTOLIC BLOOD PRESSURE: 135 MMHG | HEART RATE: 90 BPM | DIASTOLIC BLOOD PRESSURE: 75 MMHG | OXYGEN SATURATION: 99 %

## 2018-04-12 DIAGNOSIS — Z86.59 HISTORY OF ANXIETY: Primary | ICD-10-CM

## 2018-04-12 PROCEDURE — 99281 EMR DPT VST MAYX REQ PHY/QHP: CPT

## 2018-04-12 RX ORDER — GABAPENTIN 800 MG/1
800 TABLET ORAL 4 TIMES DAILY
COMMUNITY
End: 2018-04-12

## 2018-04-12 RX ORDER — GABAPENTIN 800 MG/1
800 TABLET ORAL 3 TIMES DAILY
Qty: 21 TABLET | Refills: 0 | Status: SHIPPED | OUTPATIENT
Start: 2018-04-12 | End: 2018-08-30 | Stop reason: ALTCHOICE

## 2018-04-12 RX ORDER — BUPROPION HYDROCHLORIDE 150 MG/1
150 TABLET, EXTENDED RELEASE ORAL 2 TIMES DAILY
Qty: 60 TABLET | Refills: 0 | Status: SHIPPED | OUTPATIENT
Start: 2018-04-12 | End: 2018-09-29

## 2018-04-12 RX ORDER — BUSPIRONE HYDROCHLORIDE 5 MG/1
5 TABLET ORAL DAILY
COMMUNITY
End: 2018-04-12

## 2018-04-12 RX ORDER — BUPROPION HYDROCHLORIDE 150 MG/1
150 TABLET, EXTENDED RELEASE ORAL 2 TIMES DAILY
COMMUNITY
End: 2018-04-12

## 2018-04-12 RX ORDER — BUSPIRONE HYDROCHLORIDE 5 MG/1
5 TABLET ORAL DAILY
Qty: 7 TABLET | Refills: 0 | Status: SHIPPED | OUTPATIENT
Start: 2018-04-12 | End: 2018-09-29

## 2018-04-12 RX ORDER — GABAPENTIN 800 MG/1
800 TABLET ORAL 3 TIMES DAILY
Qty: 90 TABLET | Refills: 0 | Status: SHIPPED | OUTPATIENT
Start: 2018-04-12 | End: 2018-08-30 | Stop reason: ALTCHOICE

## 2018-04-27 ENCOUNTER — HOSPITAL ENCOUNTER (EMERGENCY)
Age: 38
Discharge: HOME | End: 2018-04-28
Attending: FAMILY MEDICINE
Payer: COMMERCIAL

## 2018-04-27 ENCOUNTER — APPOINTMENT (OUTPATIENT)
Dept: GENERAL RADIOLOGY | Age: 38
End: 2018-04-27
Payer: COMMERCIAL

## 2018-04-27 VITALS
HEART RATE: 112 BPM | TEMPERATURE: 98.1 F | OXYGEN SATURATION: 99 % | DIASTOLIC BLOOD PRESSURE: 71 MMHG | SYSTOLIC BLOOD PRESSURE: 125 MMHG

## 2018-04-27 DIAGNOSIS — M25.531 RIGHT WRIST PAIN: Primary | ICD-10-CM

## 2018-04-27 DIAGNOSIS — S63.501A SPRAIN OF RIGHT WRIST, INITIAL ENCOUNTER: ICD-10-CM

## 2018-04-27 PROCEDURE — 73110 X-RAY EXAM OF WRIST: CPT

## 2018-04-27 PROCEDURE — 99283 EMERGENCY DEPT VISIT LOW MDM: CPT

## 2018-04-27 ASSESSMENT — PAIN DESCRIPTION - LOCATION: LOCATION: HAND

## 2018-04-27 ASSESSMENT — PAIN SCALES - GENERAL: PAINLEVEL_OUTOF10: 10

## 2018-04-27 ASSESSMENT — PAIN DESCRIPTION - ORIENTATION: ORIENTATION: RIGHT

## 2018-04-27 ASSESSMENT — PAIN DESCRIPTION - PAIN TYPE: TYPE: ACUTE PAIN

## 2018-04-27 ASSESSMENT — PAIN DESCRIPTION - DESCRIPTORS: DESCRIPTORS: THROBBING

## 2018-04-27 ASSESSMENT — PAIN DESCRIPTION - FREQUENCY: FREQUENCY: CONTINUOUS

## 2018-04-28 PROCEDURE — 6370000000 HC RX 637 (ALT 250 FOR IP): Performed by: FAMILY MEDICINE

## 2018-04-28 RX ORDER — NAPROXEN 500 MG/1
500 TABLET ORAL 2 TIMES DAILY
Qty: 30 TABLET | Refills: 0 | Status: SHIPPED | OUTPATIENT
Start: 2018-04-28 | End: 2018-05-23

## 2018-04-28 RX ORDER — NAPROXEN 500 MG/1
500 TABLET ORAL 2 TIMES DAILY WITH MEALS
Status: DISCONTINUED | OUTPATIENT
Start: 2018-04-28 | End: 2018-04-28 | Stop reason: HOSPADM

## 2018-04-28 RX ADMIN — NAPROXEN 500 MG: 500 TABLET, DELAYED RELEASE ORAL at 00:10

## 2018-04-28 ASSESSMENT — PAIN SCALES - GENERAL: PAINLEVEL_OUTOF10: 8

## 2018-05-23 ENCOUNTER — HOSPITAL ENCOUNTER (EMERGENCY)
Age: 38
Discharge: HOME | End: 2018-05-23
Attending: FAMILY MEDICINE
Payer: COMMERCIAL

## 2018-05-23 VITALS
BODY MASS INDEX: 24.41 KG/M2 | OXYGEN SATURATION: 99 % | TEMPERATURE: 98.6 F | DIASTOLIC BLOOD PRESSURE: 82 MMHG | WEIGHT: 175 LBS | HEART RATE: 88 BPM | SYSTOLIC BLOOD PRESSURE: 135 MMHG | RESPIRATION RATE: 20 BRPM

## 2018-05-23 DIAGNOSIS — K04.7 DENTAL ABSCESS: Primary | ICD-10-CM

## 2018-05-23 PROCEDURE — 99282 EMERGENCY DEPT VISIT SF MDM: CPT

## 2018-05-23 RX ORDER — GABAPENTIN 600 MG/1
800 TABLET ORAL 4 TIMES DAILY
COMMUNITY
End: 2018-08-30 | Stop reason: ALTCHOICE

## 2018-05-23 RX ORDER — AMOXICILLIN 500 MG/1
500 CAPSULE ORAL 3 TIMES DAILY
Qty: 30 CAPSULE | Refills: 0 | Status: SHIPPED | OUTPATIENT
Start: 2018-05-23 | End: 2018-06-02

## 2018-05-23 RX ORDER — NAPROXEN 500 MG/1
500 TABLET ORAL 2 TIMES DAILY
Qty: 60 TABLET | Refills: 0 | Status: SHIPPED | OUTPATIENT
Start: 2018-05-23 | End: 2018-08-30 | Stop reason: ALTCHOICE

## 2018-08-30 ENCOUNTER — HOSPITAL ENCOUNTER (EMERGENCY)
Age: 38
Discharge: HOME | End: 2018-08-30
Attending: EMERGENCY MEDICINE
Payer: COMMERCIAL

## 2018-08-30 VITALS
OXYGEN SATURATION: 20 % | SYSTOLIC BLOOD PRESSURE: 113 MMHG | HEART RATE: 77 BPM | BODY MASS INDEX: 23.71 KG/M2 | WEIGHT: 170 LBS | DIASTOLIC BLOOD PRESSURE: 82 MMHG | TEMPERATURE: 97.1 F | RESPIRATION RATE: 99 BRPM

## 2018-08-30 DIAGNOSIS — K08.89 PAIN, DENTAL: Primary | ICD-10-CM

## 2018-08-30 DIAGNOSIS — K04.7 DENTAL INFECTION: ICD-10-CM

## 2018-08-30 PROCEDURE — 99282 EMERGENCY DEPT VISIT SF MDM: CPT

## 2018-08-30 RX ORDER — GABAPENTIN 800 MG/1
800 TABLET ORAL 3 TIMES DAILY
COMMUNITY
End: 2018-09-29

## 2018-08-30 RX ORDER — AMOXICILLIN 500 MG/1
500 CAPSULE ORAL 3 TIMES DAILY
Qty: 30 CAPSULE | Refills: 0 | Status: SHIPPED | OUTPATIENT
Start: 2018-08-30 | End: 2018-09-09

## 2018-08-30 ASSESSMENT — PAIN DESCRIPTION - LOCATION: LOCATION: TEETH

## 2018-08-30 ASSESSMENT — PAIN DESCRIPTION - PAIN TYPE: TYPE: ACUTE PAIN

## 2018-08-30 ASSESSMENT — PAIN SCALES - GENERAL: PAINLEVEL_OUTOF10: 9

## 2018-09-29 ENCOUNTER — HOSPITAL ENCOUNTER (EMERGENCY)
Age: 38
Discharge: HOME | End: 2018-09-29
Attending: FAMILY MEDICINE
Payer: COMMERCIAL

## 2018-09-29 VITALS
RESPIRATION RATE: 18 BRPM | OXYGEN SATURATION: 99 % | SYSTOLIC BLOOD PRESSURE: 126 MMHG | HEIGHT: 71 IN | HEART RATE: 78 BPM | DIASTOLIC BLOOD PRESSURE: 75 MMHG | TEMPERATURE: 98.2 F | WEIGHT: 175.93 LBS | BODY MASS INDEX: 24.63 KG/M2

## 2018-09-29 DIAGNOSIS — Z86.59 HISTORY OF ANXIETY: ICD-10-CM

## 2018-09-29 DIAGNOSIS — F32.89 OTHER DEPRESSION: ICD-10-CM

## 2018-09-29 DIAGNOSIS — F41.1 ANXIETY STATE: Primary | ICD-10-CM

## 2018-09-29 PROCEDURE — 99281 EMR DPT VST MAYX REQ PHY/QHP: CPT

## 2018-09-29 RX ORDER — BUSPIRONE HYDROCHLORIDE 5 MG/1
5 TABLET ORAL 2 TIMES DAILY
Qty: 14 TABLET | Refills: 0 | Status: SHIPPED | OUTPATIENT
Start: 2018-09-29 | End: 2018-10-23 | Stop reason: SDUPTHER

## 2018-09-29 RX ORDER — GABAPENTIN 600 MG/1
600 TABLET ORAL 3 TIMES DAILY
Qty: 21 TABLET | Refills: 0 | Status: SHIPPED | OUTPATIENT
Start: 2018-09-29 | End: 2018-10-09

## 2018-09-29 RX ORDER — BUPROPION HYDROCHLORIDE 150 MG/1
150 TABLET, EXTENDED RELEASE ORAL 2 TIMES DAILY
Qty: 14 TABLET | Refills: 0 | Status: SHIPPED | OUTPATIENT
Start: 2018-09-29 | End: 2018-10-23 | Stop reason: SDUPTHER

## 2018-10-09 ENCOUNTER — HOSPITAL ENCOUNTER (EMERGENCY)
Age: 38
Discharge: HOME | End: 2018-10-09
Attending: INTERNAL MEDICINE
Payer: COMMERCIAL

## 2018-10-09 VITALS
RESPIRATION RATE: 16 BRPM | HEIGHT: 71 IN | OXYGEN SATURATION: 100 % | BODY MASS INDEX: 23.66 KG/M2 | HEART RATE: 64 BPM | WEIGHT: 169 LBS | TEMPERATURE: 98.6 F | SYSTOLIC BLOOD PRESSURE: 111 MMHG | DIASTOLIC BLOOD PRESSURE: 68 MMHG

## 2018-10-09 DIAGNOSIS — Z76.0 ENCOUNTER FOR MEDICATION REFILL: Primary | ICD-10-CM

## 2018-10-09 DIAGNOSIS — G62.9 NEUROPATHY: ICD-10-CM

## 2018-10-09 DIAGNOSIS — F41.1 ANXIETY STATE: ICD-10-CM

## 2018-10-09 PROCEDURE — 99281 EMR DPT VST MAYX REQ PHY/QHP: CPT

## 2018-10-09 RX ORDER — GABAPENTIN 800 MG/1
800 TABLET ORAL 3 TIMES DAILY
Qty: 21 TABLET | Refills: 0 | Status: SHIPPED | OUTPATIENT
Start: 2018-10-09 | End: 2020-10-26

## 2018-10-09 NOTE — ED NOTES
Med rec complete. Pt states hes is out of his Gabapentin and does not have pcp. Is here for a med refill for Gabapentin. States does not need refill on other two medications that he is prescribed.      Judge Irma RN  10/09/18 5493

## 2018-10-22 PROBLEM — G25.81 RESTLESS LEG SYNDROME: Status: ACTIVE | Noted: 2018-04-04

## 2018-10-22 PROBLEM — F41.9 ANXIETY AND DEPRESSION: Status: ACTIVE | Noted: 2018-04-04

## 2018-10-22 PROBLEM — F32.A ANXIETY AND DEPRESSION: Status: ACTIVE | Noted: 2018-04-04

## 2018-10-23 ENCOUNTER — OFFICE VISIT (OUTPATIENT)
Dept: FAMILY MEDICINE CLINIC | Age: 38
End: 2018-10-23
Payer: COMMERCIAL

## 2018-10-23 VITALS
BODY MASS INDEX: 23.94 KG/M2 | DIASTOLIC BLOOD PRESSURE: 84 MMHG | SYSTOLIC BLOOD PRESSURE: 120 MMHG | HEIGHT: 71 IN | RESPIRATION RATE: 18 BRPM | WEIGHT: 171 LBS | HEART RATE: 102 BPM | OXYGEN SATURATION: 97 %

## 2018-10-23 DIAGNOSIS — F41.9 ANXIETY AND DEPRESSION: ICD-10-CM

## 2018-10-23 DIAGNOSIS — F32.A ANXIETY AND DEPRESSION: ICD-10-CM

## 2018-10-23 DIAGNOSIS — Z86.59 HISTORY OF ANXIETY: ICD-10-CM

## 2018-10-23 DIAGNOSIS — G25.81 RESTLESS LEG SYNDROME: Primary | ICD-10-CM

## 2018-10-23 PROCEDURE — G8420 CALC BMI NORM PARAMETERS: HCPCS | Performed by: INTERNAL MEDICINE

## 2018-10-23 PROCEDURE — G8484 FLU IMMUNIZE NO ADMIN: HCPCS | Performed by: INTERNAL MEDICINE

## 2018-10-23 PROCEDURE — 4004F PT TOBACCO SCREEN RCVD TLK: CPT | Performed by: INTERNAL MEDICINE

## 2018-10-23 PROCEDURE — G8427 DOCREV CUR MEDS BY ELIG CLIN: HCPCS | Performed by: INTERNAL MEDICINE

## 2018-10-23 PROCEDURE — 99213 OFFICE O/P EST LOW 20 MIN: CPT | Performed by: INTERNAL MEDICINE

## 2018-10-23 RX ORDER — BUPROPION HYDROCHLORIDE 150 MG/1
150 TABLET, EXTENDED RELEASE ORAL 2 TIMES DAILY
Qty: 14 TABLET | Refills: 0 | Status: SHIPPED | OUTPATIENT
Start: 2018-10-23

## 2018-10-23 RX ORDER — BUSPIRONE HYDROCHLORIDE 5 MG/1
5 TABLET ORAL 2 TIMES DAILY
Qty: 14 TABLET | Refills: 0 | Status: SHIPPED | OUTPATIENT
Start: 2018-10-23 | End: 2020-10-26

## 2018-10-23 RX ORDER — PRAMIPEXOLE DIHYDROCHLORIDE 0.5 MG/1
0.5 TABLET ORAL NIGHTLY
Qty: 90 TABLET | Refills: 3 | Status: SHIPPED | OUTPATIENT
Start: 2018-10-23 | End: 2020-08-22 | Stop reason: ALTCHOICE

## 2018-10-23 ASSESSMENT — ENCOUNTER SYMPTOMS
SORE THROAT: 0
COUGH: 0
WHEEZING: 0
CONSTIPATION: 0
ABDOMINAL PAIN: 0
NAUSEA: 0
BLOOD IN STOOL: 0
SHORTNESS OF BREATH: 0
ALLERGIC/IMMUNOLOGIC NEGATIVE: 1

## 2018-10-23 NOTE — PROGRESS NOTES
days..  Patient taking differently: Take 800 mg by mouth 4 times daily. . 10/9/18 10/23/18 Yes Storm Jnekins MD        Allergies:       Patient has no known allergies. Social History:     Tobacco: reports that he has been smoking Cigarettes. He has a 5.00 pack-year smoking history. He has quit using smokeless tobacco.  Alcohol:      reports that he does not drink alcohol. Drug Use:  reports that he does not use drugs. Family History:     No family history on file. Review of Systems:         Review of Systems   Constitutional: Negative for activity change, appetite change, chills and unexpected weight change. HENT: Negative for congestion, ear discharge, ear pain and sore throat. Eyes: Negative for visual disturbance. Respiratory: Negative for cough, shortness of breath and wheezing. Cardiovascular: Negative for chest pain, palpitations and leg swelling. Gastrointestinal: Negative for abdominal pain, blood in stool, constipation and nausea. Endocrine: Negative for cold intolerance, heat intolerance, polydipsia and polyuria. Genitourinary: Negative for difficulty urinating and dysuria. Musculoskeletal: Negative. Skin: Negative for rash. Allergic/Immunologic: Negative. Neurological: Positive for tremors (in legs). Negative for dizziness, seizures, weakness, numbness and headaches. Psychiatric/Behavioral: Positive for sleep disturbance. Negative for behavioral problems, dysphoric mood, hallucinations and suicidal ideas. The patient is nervous/anxious. Physical Exam:     Vitals:  /84 (Site: Left Upper Arm, Position: Sitting, Cuff Size: Medium Adult)   Pulse 102   Resp 18   Ht 5' 11\" (1.803 m)   Wt 171 lb (77.6 kg)   SpO2 97%   BMI 23.85 kg/m²       Physical Exam   Constitutional: He is oriented to person, place, and time. He appears well-developed and well-nourished. No distress. HENT:   Head: Normocephalic and atraumatic.    Right Ear: External ear normal. Mouth/Throat: Oropharynx is clear and moist. No oropharyngeal exudate. Left ear with significant amount of cerumen   Eyes: Pupils are equal, round, and reactive to light. Conjunctivae and EOM are normal. Right eye exhibits no discharge. Left eye exhibits no discharge. No scleral icterus. Neck: No thyromegaly present. Cardiovascular: Normal rate, regular rhythm and normal heart sounds. No murmur heard. Pulmonary/Chest: Effort normal and breath sounds normal. He has no wheezes. He has no rales. Abdominal: Soft. Bowel sounds are normal. He exhibits no distension and no mass. There is no tenderness. Musculoskeletal: Normal range of motion. He exhibits no edema, tenderness or deformity. Lymphadenopathy:     He has no cervical adenopathy. Neurological: He is alert and oriented to person, place, and time. No cranial nerve deficit. Coordination normal.   Skin: Skin is warm and dry. No rash noted. Psychiatric: He has a normal mood and affect. His behavior is normal. Judgment normal.   Vitals reviewed. Data:     Lab Results   Component Value Date     08/01/2017    K 3.5 08/01/2017     08/01/2017    CO2 25 08/01/2017    BUN 16 08/01/2017    CREATININE 0.82 08/01/2017    GLUCOSE 97 08/01/2017    PROT 7.0 08/01/2017    LABALBU 4.1 08/01/2017    BILITOT 0.50 08/01/2017    ALKPHOS 77 08/01/2017    AST 61 08/01/2017    ALT 60 08/01/2017     Lab Results   Component Value Date    WBC 9.3 08/01/2017    RBC 4.90 08/01/2017    HGB 13.7 08/01/2017    HCT 40.9 08/01/2017    MCV 83.4 08/01/2017    MCH 28.0 08/01/2017    MCHC 33.5 08/01/2017    RDW 13.9 08/01/2017     08/01/2017    MPV NOT REPORTED 08/01/2017     No results found for: TSH  No results found for: CHOL, HDL, PSA, LABA1C       Assessment & Plan        Diagnosis Orders   1. Restless leg syndrome   Prescribed Mirapex. Advised against high dose Gabapentin. pramipexole (MIRAPEX) 0.5 MG tablet   2.  Anxiety and depression Continue on Wellbutrin and Buspar busPIRone (BUSPAR) 5 MG tablet    buPROPion (WELLBUTRIN SR) 150 MG extended release tablet   3. History of anxiety  busPIRone (BUSPAR) 5 MG tablet                   Completed Refills   Requested Prescriptions     Signed Prescriptions Disp Refills    busPIRone (BUSPAR) 5 MG tablet 14 tablet 0     Sig: Take 1 tablet by mouth 2 times daily    buPROPion (WELLBUTRIN SR) 150 MG extended release tablet 14 tablet 0     Sig: Take 1 tablet by mouth 2 times daily    pramipexole (MIRAPEX) 0.5 MG tablet 90 tablet 3     Sig: Take 1 tablet by mouth nightly     Return in about 4 weeks (around 11/20/2018) for restless leg. Orders Placed This Encounter   Medications    busPIRone (BUSPAR) 5 MG tablet     Sig: Take 1 tablet by mouth 2 times daily     Dispense:  14 tablet     Refill:  0    buPROPion (WELLBUTRIN SR) 150 MG extended release tablet     Sig: Take 1 tablet by mouth 2 times daily     Dispense:  14 tablet     Refill:  0    pramipexole (MIRAPEX) 0.5 MG tablet     Sig: Take 1 tablet by mouth nightly     Dispense:  90 tablet     Refill:  3     No orders of the defined types were placed in this encounter. Patient Instructions     SURVEY:    You may be receiving a survey from [employer] regarding your visit today. Please complete the survey to enable us to provide the highest quality of care to you and your family. If you cannot score us a very good on any question, please call the office to discuss how we could of made your experience a very good one. Thank you.         Electronically signed by Wendy Adame MD on 10/23/2018 at 8:47 PM           Completed Refills   Requested Prescriptions     Signed Prescriptions Disp Refills    busPIRone (BUSPAR) 5 MG tablet 14 tablet 0     Sig: Take 1 tablet by mouth 2 times daily    buPROPion (WELLBUTRIN SR) 150 MG extended release tablet 14 tablet 0     Sig: Take 1 tablet by mouth 2 times daily    pramipexole (MIRAPEX) 0.5 MG

## 2018-11-21 ENCOUNTER — HOSPITAL ENCOUNTER (EMERGENCY)
Age: 38
Discharge: HOME | End: 2018-11-21
Attending: EMERGENCY MEDICINE
Payer: COMMERCIAL

## 2018-11-21 VITALS
RESPIRATION RATE: 18 BRPM | TEMPERATURE: 98.1 F | HEART RATE: 76 BPM | WEIGHT: 175 LBS | BODY MASS INDEX: 24.41 KG/M2 | DIASTOLIC BLOOD PRESSURE: 78 MMHG | SYSTOLIC BLOOD PRESSURE: 136 MMHG | OXYGEN SATURATION: 99 %

## 2018-11-21 DIAGNOSIS — K02.9 PAIN DUE TO DENTAL CARIES: ICD-10-CM

## 2018-11-21 DIAGNOSIS — K08.89 PAIN, DENTAL: Primary | ICD-10-CM

## 2018-11-21 DIAGNOSIS — Z72.0 TOBACCO ABUSE: ICD-10-CM

## 2018-11-21 PROCEDURE — 99282 EMERGENCY DEPT VISIT SF MDM: CPT

## 2018-11-21 PROCEDURE — 6370000000 HC RX 637 (ALT 250 FOR IP): Performed by: EMERGENCY MEDICINE

## 2018-11-21 RX ORDER — IBUPROFEN 200 MG
600 TABLET ORAL ONCE
Status: COMPLETED | OUTPATIENT
Start: 2018-11-21 | End: 2018-11-21

## 2018-11-21 RX ORDER — PENICILLIN V POTASSIUM 500 MG/1
500 TABLET ORAL 4 TIMES DAILY
Qty: 40 TABLET | Refills: 0 | Status: SHIPPED | OUTPATIENT
Start: 2018-11-21 | End: 2018-12-01

## 2018-11-21 RX ORDER — PENICILLIN V POTASSIUM 250 MG/1
500 TABLET ORAL ONCE
Status: COMPLETED | OUTPATIENT
Start: 2018-11-21 | End: 2018-11-21

## 2018-11-21 RX ORDER — IBUPROFEN 600 MG/1
600 TABLET ORAL EVERY 6 HOURS PRN
Qty: 30 TABLET | Refills: 0 | Status: SHIPPED | OUTPATIENT
Start: 2018-11-21

## 2018-11-21 RX ADMIN — IBUPROFEN 600 MG: 200 TABLET, FILM COATED ORAL at 20:55

## 2018-11-21 RX ADMIN — PENICILLIN V POTASSIUM 500 MG: 250 TABLET ORAL at 20:55

## 2018-11-21 ASSESSMENT — PAIN SCALES - GENERAL
PAINLEVEL_OUTOF10: 7
PAINLEVEL_OUTOF10: 7

## 2018-11-21 ASSESSMENT — PAIN DESCRIPTION - ORIENTATION: ORIENTATION: RIGHT

## 2018-11-21 ASSESSMENT — PAIN DESCRIPTION - DESCRIPTORS: DESCRIPTORS: ACHING;PRESSURE

## 2018-11-21 ASSESSMENT — PAIN DESCRIPTION - FREQUENCY: FREQUENCY: CONTINUOUS

## 2018-11-21 ASSESSMENT — PAIN DESCRIPTION - LOCATION: LOCATION: JAW

## 2018-11-21 ASSESSMENT — PAIN DESCRIPTION - PAIN TYPE: TYPE: ACUTE PAIN

## 2018-11-22 NOTE — ED NOTES
patient to ED with c/o pain on right side of jaw, state  s he has tyshawn treated before for the same thing and did not follow up with dentist     Samantha Garcia RN  11/21/18 2041

## 2019-11-12 ENCOUNTER — APPOINTMENT (OUTPATIENT)
Dept: CT IMAGING | Age: 39
End: 2019-11-12
Payer: COMMERCIAL

## 2019-11-12 ENCOUNTER — HOSPITAL ENCOUNTER (EMERGENCY)
Age: 39
Discharge: LEFT BEFORE DISCHARGE | End: 2019-11-12
Attending: INTERNAL MEDICINE
Payer: COMMERCIAL

## 2019-11-12 ENCOUNTER — APPOINTMENT (OUTPATIENT)
Dept: GENERAL RADIOLOGY | Age: 39
End: 2019-11-12
Payer: COMMERCIAL

## 2019-11-12 VITALS
OXYGEN SATURATION: 100 % | WEIGHT: 175 LBS | HEIGHT: 71 IN | TEMPERATURE: 97.9 F | HEART RATE: 106 BPM | RESPIRATION RATE: 18 BRPM | BODY MASS INDEX: 24.5 KG/M2

## 2019-11-12 DIAGNOSIS — Z72.0 TOBACCO ABUSE: ICD-10-CM

## 2019-11-12 DIAGNOSIS — R41.82 ALTERED MENTAL STATUS, UNSPECIFIED ALTERED MENTAL STATUS TYPE: Primary | ICD-10-CM

## 2019-11-12 DIAGNOSIS — J96.01 ACUTE RESPIRATORY FAILURE WITH HYPOXIA (HCC): ICD-10-CM

## 2019-11-12 DIAGNOSIS — T68.XXXA HYPOTHERMIA DUE TO COLD ENVIRONMENT: ICD-10-CM

## 2019-11-12 DIAGNOSIS — T40.2X1A OPIOID OVERDOSE, ACCIDENTAL OR UNINTENTIONAL, INITIAL ENCOUNTER (HCC): ICD-10-CM

## 2019-11-12 LAB
ABSOLUTE EOS #: 0.14 K/UL (ref 0–0.4)
ABSOLUTE IMMATURE GRANULOCYTE: ABNORMAL K/UL (ref 0–0.3)
ABSOLUTE LYMPH #: 5.42 K/UL (ref 1–4.8)
ABSOLUTE MONO #: 0.97 K/UL (ref 0–1)
ACETAMINOPHEN LEVEL: <5 UG/ML (ref 10–30)
ALBUMIN SERPL-MCNC: 5.1 G/DL (ref 3.5–5.2)
ALBUMIN/GLOBULIN RATIO: ABNORMAL (ref 1–2.5)
ALP BLD-CCNC: 105 U/L (ref 40–129)
ALT SERPL-CCNC: 16 U/L (ref 5–41)
ANION GAP SERPL CALCULATED.3IONS-SCNC: 15 MMOL/L (ref 9–17)
AST SERPL-CCNC: 30 U/L
BASOPHILS # BLD: 1 % (ref 0–2)
BASOPHILS ABSOLUTE: 0.14 K/UL (ref 0–0.2)
BILIRUB SERPL-MCNC: 0.54 MG/DL (ref 0.3–1.2)
BUN BLDV-MCNC: 24 MG/DL (ref 6–20)
BUN/CREAT BLD: ABNORMAL (ref 9–20)
CALCIUM SERPL-MCNC: 9.9 MG/DL (ref 8.6–10.4)
CHLORIDE BLD-SCNC: 97 MMOL/L (ref 98–107)
CO2: 25 MMOL/L (ref 20–31)
CREAT SERPL-MCNC: 1.2 MG/DL (ref 0.7–1.2)
DIFFERENTIAL TYPE: ABNORMAL
EOSINOPHILS RELATIVE PERCENT: 1 % (ref 0–5)
ETHANOL PERCENT: <0.01 %
ETHANOL: <10 MG/DL
GFR AFRICAN AMERICAN: >60 ML/MIN
GFR NON-AFRICAN AMERICAN: >60 ML/MIN
GFR SERPL CREATININE-BSD FRML MDRD: ABNORMAL ML/MIN/{1.73_M2}
GFR SERPL CREATININE-BSD FRML MDRD: ABNORMAL ML/MIN/{1.73_M2}
GLUCOSE BLD-MCNC: 181 MG/DL (ref 70–99)
HCT VFR BLD CALC: 44.2 % (ref 41–53)
HEMOGLOBIN: 14.7 G/DL (ref 13.5–17.5)
IMMATURE GRANULOCYTES: ABNORMAL %
LYMPHOCYTES # BLD: 39 % (ref 13–44)
MAGNESIUM: 2.2 MG/DL (ref 1.6–2.6)
MCH RBC QN AUTO: 28.6 PG (ref 26–34)
MCHC RBC AUTO-ENTMCNC: 33.2 G/DL (ref 31–37)
MCV RBC AUTO: 86 FL (ref 80–100)
MONOCYTES # BLD: 7 % (ref 5–9)
MORPHOLOGY: ABNORMAL
NRBC AUTOMATED: ABNORMAL PER 100 WBC
PDW BLD-RTO: 13.3 % (ref 12.1–15.2)
PLATELET # BLD: 197 K/UL (ref 140–450)
PLATELET ESTIMATE: ABNORMAL
PMV BLD AUTO: ABNORMAL FL (ref 6–12)
POTASSIUM SERPL-SCNC: 3.4 MMOL/L (ref 3.7–5.3)
RBC # BLD: 5.14 M/UL (ref 4.5–5.9)
RBC # BLD: ABNORMAL 10*6/UL
SALICYLATE LEVEL: <1 MG/DL (ref 3–10)
SEG NEUTROPHILS: 52 % (ref 39–75)
SEGMENTED NEUTROPHILS ABSOLUTE COUNT: 7.23 K/UL (ref 2.1–6.5)
SODIUM BLD-SCNC: 137 MMOL/L (ref 135–144)
TOTAL PROTEIN: 8.8 G/DL (ref 6.4–8.3)
TROPONIN INTERP: NORMAL
TROPONIN T: <0.03 NG/ML
TROPONIN, HIGH SENSITIVITY: NORMAL NG/L (ref 0–22)
WBC # BLD: 13.9 K/UL (ref 3.5–11)
WBC # BLD: ABNORMAL 10*3/UL

## 2019-11-12 PROCEDURE — 80307 DRUG TEST PRSMV CHEM ANLYZR: CPT

## 2019-11-12 PROCEDURE — G0480 DRUG TEST DEF 1-7 CLASSES: HCPCS

## 2019-11-12 PROCEDURE — 85025 COMPLETE CBC W/AUTO DIFF WBC: CPT

## 2019-11-12 PROCEDURE — 84484 ASSAY OF TROPONIN QUANT: CPT

## 2019-11-12 PROCEDURE — 99285 EMERGENCY DEPT VISIT HI MDM: CPT

## 2019-11-12 PROCEDURE — 80053 COMPREHEN METABOLIC PANEL: CPT

## 2019-11-12 PROCEDURE — 36415 COLL VENOUS BLD VENIPUNCTURE: CPT

## 2019-11-12 PROCEDURE — 83735 ASSAY OF MAGNESIUM: CPT

## 2019-11-12 RX ORDER — 0.9 % SODIUM CHLORIDE 0.9 %
1000 INTRAVENOUS SOLUTION INTRAVENOUS ONCE
Status: DISCONTINUED | OUTPATIENT
Start: 2019-11-12 | End: 2019-11-12 | Stop reason: HOSPADM

## 2020-01-03 ENCOUNTER — HOSPITAL ENCOUNTER (EMERGENCY)
Age: 40
Discharge: LEFT BEFORE DISCHARGE | End: 2020-01-03
Attending: INTERNAL MEDICINE
Payer: COMMERCIAL

## 2020-01-03 PROCEDURE — 99284 EMERGENCY DEPT VISIT MOD MDM: CPT

## 2020-01-03 NOTE — ED PROVIDER NOTES
[facility] ED  EMERGENCY DEPARTMENT ENCOUNTER      Pt Name: Brody Junior  MRN: [id number]  [place] 1980  Date of evaluation: 1/3/2020  Provider: Ashley Crisostomo MD    CHIEF COMPLAINT       Chief Complaint   Patient presents with    Drug Overdose         HISTORY OF PRESENT ILLNESS   (Location/Symptom, Timing/Onset, Context/Setting, Quality, Duration, Modifying Factors, Severity)  Note limiting factors. Brody Junior is a 35 y.o. male who presents to the emergency department by EMS for evaluation management of altered mental status. The police stated that he was found acting abnormally but he is not under arrest and may leave [address] HPI    Nursing Notes were reviewed. REVIEW OF SYSTEMS    (2-9 systems for level 4, 10 or more for level 5)       REVIEW OF SYSTEMS    Constitutional: Negative for fatigue and fever. Unable to obtain because patient left AGAINST MEDICAL ADVICE. Except as noted above theremainder of the review of systems was reviewed and negative. PASTMEDICAL HISTORY     Past Medical History:   Diagnosis Date    Anxiety     Drug abuse ([facility] [address]     Restless leg          SURGICAL HISTORY     No past surgical history on file. CURRENT MEDICATIONS       Previous Medications    BUPROPION (WELLBUTRIN SR) 150 MG EXTENDED RELEASE TABLET    Take 1 tablet by mouth 2 times daily    BUSPIRONE (BUSPAR) 5 MG TABLET    Take 1 tablet by mouth 2 times daily    GABAPENTIN (NEURONTIN) 800 MG TABLET    Take 1 tablet by mouth three times daily for 14 days. .    IBUPROFEN (IBU) 600 MG TABLET    Take 1 tablet by mouth every 6 hours as needed for Pain    PRAMIPEXOLE (MIRAPEX) 0.5 MG TABLET    Take 1 tablet by mouth nightly       ALLERGIES     Patient has no known allergies. FAMILY HISTORY     No family history on file.        SOCIAL HISTORY       Social History     Socioeconomic History    Marital status: Single     Spouse name: Not on file    Number of children: Not on file    Years of education: Not on file    Highest education level: Not on file   Occupational History    Not on file   Social Needs    Financial resource strain: Not on file    Food insecurity:     Worry: Not on file     Inability: Not on file    Transportation needs:     Medical: Not on file     Non-medical: Not on file   Tobacco Use    Smoking status: Current Every Day Smoker     Packs/day: 0.50     Years: 10.00     Pack years: 5.00     Types: Cigarettes    Smokeless tobacco: Former User   Substance and Sexual Activity    Alcohol use: No    Drug use: No     Comment: Clean since June 2016    Sexual activity: Not on file   Lifestyle    Physical activity:     Days per week: Not on file     Minutes per session: Not on file    Stress: Not on file   Relationships    Social connections:     Talks on phone: Not on file     Gets together: Not on file     Attends Religion service: Not on file     Active member of club or organization: Not on file     Attends meetings of clubs or organizations: Not on file     Relationship status: Not on file    Intimate partner violence:     Fear of current or ex partner: Not on file     Emotionally abused: Not on file     Physically abused: Not on file     Forced sexual activity: Not on file   Other Topics Concern    Not on file   Social History Narrative    Not on file       SCREENINGS             PHYSICAL EXAM    (up to 7 for level 4, 8 or more for level 5)     ED Triage Vitals   BP Temp Temp src Pulse Resp SpO2 Height Weight   -- -- -- -- -- -- -- --       Physical Exam  Physical Exam   Constitutional:  Appears well, well-developed and well-nourished. No distress noted. Non toxic in appearance. Left AGAINST MEDICAL ADVICE    DIAGNOSTIC RESULTS     EKG: All EKG's are interpreted by the Emergency Department Physician who either signs or Co-signs this chart in the absence of a cardiologist.    Not indicated.     RADIOLOGY:   Non-plain film images such as CT, DISPOSITION [place] 01/03/2020 03:26:23 PM      PATIENT REFERRED TO:  No follow-up provider specified.     DISCHARGE MEDICATIONS:  New Prescriptions    No medications on file          (Please note that portions of this note were completed with a voice recognition program.  Efforts were made to edit the dictations but occasionally words are mis-transcribed.)    Ashley Crisostomo MD (electronically signed)  Attending Emergency Physician             Ashley Crisostomo MD  01/03/20 8538

## 2020-08-22 ENCOUNTER — HOSPITAL ENCOUNTER (EMERGENCY)
Age: 40
Discharge: HOME | End: 2020-08-22
Attending: EMERGENCY MEDICINE
Payer: COMMERCIAL

## 2020-08-22 VITALS
OXYGEN SATURATION: 97 % | RESPIRATION RATE: 18 BRPM | DIASTOLIC BLOOD PRESSURE: 68 MMHG | TEMPERATURE: 100.6 F | HEART RATE: 129 BPM | SYSTOLIC BLOOD PRESSURE: 122 MMHG

## 2020-08-22 PROCEDURE — 6370000000 HC RX 637 (ALT 250 FOR IP): Performed by: EMERGENCY MEDICINE

## 2020-08-22 PROCEDURE — 99282 EMERGENCY DEPT VISIT SF MDM: CPT

## 2020-08-22 PROCEDURE — 6360000002 HC RX W HCPCS: Performed by: EMERGENCY MEDICINE

## 2020-08-22 RX ORDER — AMOXICILLIN AND CLAVULANATE POTASSIUM 875; 125 MG/1; MG/1
1 TABLET, FILM COATED ORAL 2 TIMES DAILY
Qty: 20 TABLET | Refills: 0 | Status: SHIPPED | OUTPATIENT
Start: 2020-08-22 | End: 2020-09-01

## 2020-08-22 RX ORDER — IBUPROFEN 200 MG
600 TABLET ORAL ONCE
Status: COMPLETED | OUTPATIENT
Start: 2020-08-22 | End: 2020-08-22

## 2020-08-22 RX ORDER — DEXAMETHASONE SODIUM PHOSPHATE 10 MG/ML
10 INJECTION, SOLUTION INTRAMUSCULAR; INTRAVENOUS ONCE
Status: DISCONTINUED | OUTPATIENT
Start: 2020-08-22 | End: 2020-08-22

## 2020-08-22 RX ORDER — 0.9 % SODIUM CHLORIDE 0.9 %
1000 INTRAVENOUS SOLUTION INTRAVENOUS ONCE
Status: DISCONTINUED | OUTPATIENT
Start: 2020-08-22 | End: 2020-08-22 | Stop reason: HOSPADM

## 2020-08-22 RX ORDER — AMOXICILLIN AND CLAVULANATE POTASSIUM 875; 125 MG/1; MG/1
1 TABLET, FILM COATED ORAL EVERY 12 HOURS SCHEDULED
Status: DISCONTINUED | OUTPATIENT
Start: 2020-08-22 | End: 2020-08-22 | Stop reason: HOSPADM

## 2020-08-22 RX ORDER — KETOROLAC TROMETHAMINE 30 MG/ML
15 INJECTION, SOLUTION INTRAMUSCULAR; INTRAVENOUS ONCE
Status: DISCONTINUED | OUTPATIENT
Start: 2020-08-22 | End: 2020-08-22

## 2020-08-22 RX ORDER — DEXAMETHASONE 4 MG/1
6 TABLET ORAL ONCE
Status: COMPLETED | OUTPATIENT
Start: 2020-08-22 | End: 2020-08-22

## 2020-08-22 RX ADMIN — AMOXICILLIN AND CLAVULANATE POTASSIUM 1 TABLET: 875; 125 TABLET, FILM COATED ORAL at 17:39

## 2020-08-22 RX ADMIN — IBUPROFEN 600 MG: 200 TABLET, FILM COATED ORAL at 18:01

## 2020-08-22 RX ADMIN — DEXAMETHASONE 6 MG: 4 TABLET ORAL at 18:01

## 2020-08-22 ASSESSMENT — ENCOUNTER SYMPTOMS
VOMITING: 0
TROUBLE SWALLOWING: 0
NAUSEA: 0
COLOR CHANGE: 0
BACK PAIN: 0
VOICE CHANGE: 0
EYE REDNESS: 0
COUGH: 0
DIARRHEA: 0
SHORTNESS OF BREATH: 0
ABDOMINAL PAIN: 0
EYE PAIN: 0
SORE THROAT: 1

## 2020-08-22 ASSESSMENT — PAIN SCALES - GENERAL
PAINLEVEL_OUTOF10: 10
PAINLEVEL_OUTOF10: 10

## 2020-08-22 ASSESSMENT — PAIN DESCRIPTION - PAIN TYPE: TYPE: ACUTE PAIN

## 2020-08-22 ASSESSMENT — PAIN DESCRIPTION - LOCATION: LOCATION: THROAT

## 2020-08-22 NOTE — ED PROVIDER NOTES
[facility] ED  eMERGENCY dEPARTMENT eNCOUnter      Pt Name: Yoandy Parker  MRN: [id number]  [place] 1980  Date of evaluation: 8/22/2020  Provider: Nohelia Soni MD    CHIEF COMPLAINT       Chief Complaint   Patient presents with    Pharyngitis     started yesterday      Patient is a 62-year-old male who presents to the emergency department complaining of sore throat. He states it hurts when he swallows and he is noticed some white exudate in the back of his throat and that is why he is here. He has had a fever at home. He states he is able to swallow. He denies nausea or vomiting. He denies chest pain or shortness of breath or other symptoms. Nursing Notes were reviewed. REVIEW OF SYSTEMS    (2-9 systems for level 4, 10 or more for level 5)     Review of Systems   Constitutional: Negative for chills and fever. HENT: Positive for sore throat. Negative for drooling, ear pain, trouble swallowing and voice change. Eyes: Negative for pain and redness. Respiratory: Negative for cough and shortness of breath. Cardiovascular: Negative for chest pain and palpitations. Gastrointestinal: Negative for abdominal pain, diarrhea, nausea and vomiting. Genitourinary: Negative for dysuria and frequency. Musculoskeletal: Negative for back pain and neck pain. Skin: Negative for color change and rash. Neurological: Negative for dizziness, syncope and headaches. Psychiatric/Behavioral: Negative for hallucinations and suicidal ideas. Except as noted above the remainder of the review of systems was reviewed and negative. PAST MEDICAL HISTORY     Past Medical History:   Diagnosis Date    Anxiety     Drug abuse ([facility] [address]     Restless leg          SURGICAL HISTORY     No past surgical history on file. ALLERGIES     Patient has no known allergies. FAMILY HISTORY     No family history on file.        SOCIAL HISTORY       Social History     Socioeconomic History    Marital status: Single     Spouse name: Not on file    Number of children: Not on file    Years of education: Not on file    Highest education level: Not on file   Occupational History    Not on file   Social Needs    Financial resource strain: Not on file    Food insecurity     Worry: Not on file     Inability: Not on file    Transportation needs     Medical: Not on file     Non-medical: Not on file   Tobacco Use    Smoking status: Current Every Day Smoker     Packs/day: 0.50     Years: 10.00     Pack years: 5.00     Types: Cigarettes    Smokeless tobacco: Former User   Substance and Sexual Activity    Alcohol use: No    Drug use: No     Comment: Clean since June 2016    Sexual activity: Not on file   Lifestyle    Physical activity     Days per week: Not on file     Minutes per session: Not on file    Stress: Not on file   Relationships    Social connections     Talks on phone: Not on file     Gets together: Not on file     Attends Mandaen service: Not on file     Active member of club or organization: Not on file     Attends meetings of clubs or organizations: Not on file     Relationship status: Not on file    Intimate partner violence     Fear of current or ex partner: Not on file     Emotionally abused: Not on file     Physically abused: Not on file     Forced sexual activity: Not on file   Other Topics Concern    Not on file   Social History Narrative    Not on file           PHYSICAL EXAM    (up to 7 for level 4, 8 ormore for level 5)     ED Triage Vitals   BP Temp Temp src Pulse Resp SpO2 Height Weight   08/22/20 1716 08/22/20 1717 -- 08/22/20 1717 08/22/20 1717 08/22/20 1717 -- --   122/68 100.6 °F (38.1 °C)  129 18 97 %         Physical Exam     Physical    Vital signs and nursing notes were reviewed as well as the social, family, and past medical history. Gen.  appearance: Patient is alert and oriented and in no acute distress    Head: Atraumatic, normocephalic    Neck: Supple, risk he is taking he is competent to make his decisions and will return if he wants the treatment he refused. PROCEDURES:  Unless otherwise noted below, none     Procedures    FINAL IMPRESSION      1. Acute pharyngitis, unspecified etiology    2.  Peritonsillar abscess          DISPOSITION/PLAN   DISPOSITION        PATIENT REFERRED TO:  Tammi Hughes MD  [facility] [address] [address] [phone]    In 2 days        DISCHARGE MEDICATIONS:  Discharge Medication List as of 8/22/2020  6:03 PM      START taking these medications    Details   amoxicillin-clavulanate (AUGMENTIN) 875-125 MG per tablet Take 1 tablet by mouth 2 times daily for 10 days, Disp-20 tablet,R-0Print                (Please note that portions ofthis note were completed with a voice recognition program.  Efforts were made to edit the dictations but occasionally words are mis-transcribed.)    Rupinder Lin MD(electronically signed)  Attending Emergency Physician            Rupinder Lin MD  08/22/20 [id number]

## 2020-08-22 NOTE — ED NOTES
Pt refusing IV and IV medications at this time. This nurse explains need for them. Pt states, \"I just want my script so I can go home. \"  Dr. Balwinder Camacho notified.       Charisma Elias RN  08/22/20 [id number]

## 2020-10-26 ENCOUNTER — HOSPITAL ENCOUNTER (EMERGENCY)
Age: 40
Discharge: HOME | End: 2020-10-26
Attending: EMERGENCY MEDICINE
Payer: COMMERCIAL

## 2020-10-26 VITALS
WEIGHT: 167 LBS | TEMPERATURE: 97.2 F | HEART RATE: 88 BPM | OXYGEN SATURATION: 99 % | BODY MASS INDEX: 23.29 KG/M2 | RESPIRATION RATE: 16 BRPM

## 2020-10-26 PROCEDURE — 99284 EMERGENCY DEPT VISIT MOD MDM: CPT

## 2020-10-26 PROCEDURE — 6370000000 HC RX 637 (ALT 250 FOR IP): Performed by: EMERGENCY MEDICINE

## 2020-10-26 RX ORDER — CLINDAMYCIN HYDROCHLORIDE 300 MG/1
300 CAPSULE ORAL 3 TIMES DAILY
Qty: 30 CAPSULE | Refills: 0 | Status: SHIPPED | OUTPATIENT
Start: 2020-10-26 | End: 2020-11-05

## 2020-10-26 RX ORDER — IBUPROFEN 200 MG
400 TABLET ORAL ONCE
Status: COMPLETED | OUTPATIENT
Start: 2020-10-26 | End: 2020-10-26

## 2020-10-26 RX ADMIN — IBUPROFEN 400 MG: 200 TABLET, FILM COATED ORAL at 14:48

## 2020-10-26 ASSESSMENT — PAIN DESCRIPTION - PAIN TYPE: TYPE: ACUTE PAIN

## 2020-10-26 ASSESSMENT — PAIN SCALES - GENERAL
PAINLEVEL_OUTOF10: 10
PAINLEVEL_OUTOF10: 10

## 2020-10-26 ASSESSMENT — PAIN DESCRIPTION - LOCATION: LOCATION: TEETH

## 2020-10-26 ASSESSMENT — PAIN DESCRIPTION - DESCRIPTORS: DESCRIPTORS: ACHING

## 2020-10-26 ASSESSMENT — PAIN DESCRIPTION - FREQUENCY: FREQUENCY: CONTINUOUS

## 2020-10-26 ASSESSMENT — PAIN DESCRIPTION - ORIENTATION: ORIENTATION: LEFT;UPPER

## 2020-10-26 NOTE — ED PROVIDER NOTES
eMERGENCY dEPARTMENT eNCOUnter        [address] [facility]    Chief Complaint   Patient presents with    Dental Pain     left side top teeth- x2 days        HPI    Francois Zamora is a 38 y.o. male who presents to ED from home. By car. With complaint of dental pain  Onset x 2 days. Intensity of symptoms moderate. Location of symptoms L upper jaw. Patient has poor dentition with multiple dental decays. For the past couple days patient has been having left upper jaw pain and swelling. REVIEW OF SYSTEMS    All systems reviewed and positives are in the HPI      PAST MEDICAL HISTORY    Past Medical History:   Diagnosis Date    Anxiety     Drug abuse ([facility] [address]     Restless leg        SURGICAL HISTORY    History reviewed. No pertinent surgical history. CURRENT MEDICATIONS    Current Outpatient Rx   Medication Sig Dispense Refill    clindamycin (CLEOCIN) 300 MG capsule Take 1 capsule by mouth 3 times daily for 10 days 30 capsule 0    ibuprofen (IBU) 600 MG tablet Take 1 tablet by mouth every 6 hours as needed for Pain 30 tablet 0    buPROPion (WELLBUTRIN SR) 150 MG extended release tablet Take 1 tablet by mouth 2 times daily 14 tablet 0    gabapentin (NEURONTIN) 800 MG tablet Take 1 tablet by mouth three times daily for 14 days. . (Patient taking differently: Take 800 mg by mouth 4 times daily. Crystal Emelia ) 21 tablet 0       ALLERGIES    No Known Allergies    FAMILY HISTORY    History reviewed. No pertinent family history.     SOCIAL HISTORY    Social History     Socioeconomic History    Marital status: Single     Spouse name: None    Number of children: None    Years of education: None    Highest education level: None   Occupational History    None   Social Needs    Financial resource strain: None    Food insecurity     Worry: None     Inability: None    Transportation needs     Medical: None     Non-medical: None   Tobacco Use    Smoking status: Current Every Day Smoker     Packs/day: 0.50     Years: 10.00 Pack years: 5.00     Types: Cigarettes    Smokeless tobacco: Former User   Substance and Sexual Activity    Alcohol use: No    Drug use: No     Comment: Clean since June 2016    Sexual activity: None   Lifestyle    Physical activity     Days per week: None     Minutes per session: None    Stress: None   Relationships    Social connections     Talks on phone: None     Gets together: None     Attends Adventism service: None     Active member of club or organization: None     Attends meetings of clubs or organizations: None     Relationship status: None    Intimate partner violence     Fear of current or ex partner: None     Emotionally abused: None     Physically abused: None     Forced sexual activity: None   Other Topics Concern    None   Social History Narrative    None       PHYSICAL EXAM    VITAL SIGNS: Pulse 88   Temp 97.2 °F (36.2 °C) (Oral)   Resp 16   Wt 167 lb (75.8 kg)   SpO2 99%   BMI 23.29 kg/m²   Constitutional:  Well developed, well nourished, no acute distress, non-toxic appearance   Patient appears shaking and anxious. Eyes: PERRL, conjunctiva normal   HENT: Left upper jaw with multiple dental decays dental caries. Soft tissue swelling. Respiratory: Lungs are clear to auscultation bilateral  Cardiovascular:  Normal rate, normal rhythm, no murmurs, no gallops, no rubs   Musculoskeletal:  No edema   Integument:  Well hydrated, no rash     RADIOLOGY/PROCEDURES    No orders to display         Summation      Patient Course: Patient will be sent home on clindamycin. Follow-up with a dentist as soon as possible as recommended. The warning signs were discussed. Patient is recommended to alternate ibuprofen and Tylenol every 4 hours for pain. Return to ED if worse.     ED Medications administered this visit:    Medications   ibuprofen (ADVIL;MOTRIN) tablet 400 mg (has no administration in time range)       New Prescriptions from this visit:    New Prescriptions    CLINDAMYCIN (CLEOCIN) 300 MG CAPSULE    Take 1 capsule by mouth 3 times daily for 10 days       Follow-up:  Dentist    In 2 days  Return to ED if worse        Final Impression:   1. Dental infection    2.  Dental caries               (Please note that portions of this note were completed with a voice recognition program.  Efforts were made to edit the dictations but occasionally words are mis-transcribed.)        Helene Gonzalez MD  10/26/20 7539

## 2020-10-28 ENCOUNTER — TELEPHONE (OUTPATIENT)
Dept: FAMILY MEDICINE CLINIC | Age: 40
End: 2020-10-28

## 2020-10-28 NOTE — TELEPHONE ENCOUNTER
[facility] ([facility]) ED Follow up Call    Reason for ED visit:  Dental caries    10/28/2020       VOICEMAIL DOCUMENTATION - ERASE IF NOT USED  Hi, this message is for ROSA. This is Jerry Verdugo from [employer] office. Just calling to see how you are doing after your recent visit to the Emergency Room. [employer] wants to make sure you were able to fill any prescriptions and that you understand your discharge instructions. Please return our call if you need to make a follow up appointment with your provider or have any further needs. Our phone number is [phone]. Have a great day.

## 2021-09-24 ENCOUNTER — APPOINTMENT (OUTPATIENT)
Dept: GENERAL RADIOLOGY | Age: 41
End: 2021-09-24
Payer: COMMERCIAL

## 2021-09-24 ENCOUNTER — HOSPITAL ENCOUNTER (INPATIENT)
Age: 41
LOS: 3 days | Discharge: HOME | DRG: 816 | End: 2021-09-28
Attending: EMERGENCY MEDICINE | Admitting: INTERNAL MEDICINE
Payer: COMMERCIAL

## 2021-09-24 ENCOUNTER — HOSPITAL ENCOUNTER (EMERGENCY)
Age: 41
Discharge: ANOTHER HOSPITAL | End: 2021-09-24
Attending: FAMILY MEDICINE
Payer: COMMERCIAL

## 2021-09-24 VITALS
HEIGHT: 71 IN | HEART RATE: 108 BPM | OXYGEN SATURATION: 99 % | BODY MASS INDEX: 23.1 KG/M2 | SYSTOLIC BLOOD PRESSURE: 116 MMHG | TEMPERATURE: 97.8 F | DIASTOLIC BLOOD PRESSURE: 74 MMHG | RESPIRATION RATE: 18 BRPM | WEIGHT: 165 LBS

## 2021-09-24 DIAGNOSIS — T50.904A DRUG OVERDOSE, UNDETERMINED INTENT, INITIAL ENCOUNTER: ICD-10-CM

## 2021-09-24 DIAGNOSIS — T40.601A OPIATE OVERDOSE, ACCIDENTAL OR UNINTENTIONAL, INITIAL ENCOUNTER (HCC): Primary | ICD-10-CM

## 2021-09-24 DIAGNOSIS — F19.10 POLYSUBSTANCE ABUSE (HCC): Primary | ICD-10-CM

## 2021-09-24 DIAGNOSIS — J96.00 ACUTE RESPIRATORY FAILURE, UNSPECIFIED WHETHER WITH HYPOXIA OR HYPERCAPNIA (HCC): ICD-10-CM

## 2021-09-24 LAB
AMPHETAMINE SCREEN URINE: POSITIVE
BARBITURATE SCREEN URINE: NEGATIVE
BENZODIAZEPINE SCREEN, URINE: POSITIVE
BUPRENORPHINE URINE: ABNORMAL
CANNABINOID SCREEN URINE: NEGATIVE
COCAINE METABOLITE, URINE: POSITIVE
MDMA URINE: ABNORMAL
METHADONE SCREEN, URINE: NEGATIVE
METHAMPHETAMINE, URINE: POSITIVE
OPIATES, URINE: POSITIVE
OXYCODONE SCREEN URINE: POSITIVE
PHENCYCLIDINE, URINE: NEGATIVE
PROPOXYPHENE, URINE: NEGATIVE
SARS-COV-2, RAPID: NOT DETECTED
SPECIMEN DESCRIPTION: NORMAL
TEST INFORMATION: ABNORMAL
TRICYCLIC ANTIDEPRESSANTS, UR: NEGATIVE

## 2021-09-24 PROCEDURE — 80307 DRUG TEST PRSMV CHEM ANLYZR: CPT

## 2021-09-24 PROCEDURE — 99284 EMERGENCY DEPT VISIT MOD MDM: CPT

## 2021-09-24 PROCEDURE — 80306 DRUG TEST PRSMV INSTRMNT: CPT

## 2021-09-24 PROCEDURE — 80179 DRUG ASSAY SALICYLATE: CPT

## 2021-09-24 PROCEDURE — 87635 SARS-COV-2 COVID-19 AMP PRB: CPT

## 2021-09-24 PROCEDURE — 83880 ASSAY OF NATRIURETIC PEPTIDE: CPT

## 2021-09-24 PROCEDURE — 84484 ASSAY OF TROPONIN QUANT: CPT

## 2021-09-24 PROCEDURE — 83690 ASSAY OF LIPASE: CPT

## 2021-09-24 PROCEDURE — 85025 COMPLETE CBC W/AUTO DIFF WBC: CPT

## 2021-09-24 PROCEDURE — 94002 VENT MGMT INPAT INIT DAY: CPT

## 2021-09-24 PROCEDURE — 94761 N-INVAS EAR/PLS OXIMETRY MLT: CPT

## 2021-09-24 PROCEDURE — 80053 COMPREHEN METABOLIC PANEL: CPT

## 2021-09-24 PROCEDURE — 96374 THER/PROPH/DIAG INJ IV PUSH: CPT

## 2021-09-24 PROCEDURE — 2700000000 HC OXYGEN THERAPY PER DAY

## 2021-09-24 PROCEDURE — 71045 X-RAY EXAM CHEST 1 VIEW: CPT

## 2021-09-24 PROCEDURE — 5A1945Z RESPIRATORY VENTILATION, 24-96 CONSECUTIVE HOURS: ICD-10-PCS | Performed by: INTERNAL MEDICINE

## 2021-09-24 PROCEDURE — 96376 TX/PRO/DX INJ SAME DRUG ADON: CPT

## 2021-09-24 PROCEDURE — 99285 EMERGENCY DEPT VISIT HI MDM: CPT

## 2021-09-24 PROCEDURE — 80143 DRUG ASSAY ACETAMINOPHEN: CPT

## 2021-09-24 PROCEDURE — 6360000002 HC RX W HCPCS: Performed by: FAMILY MEDICINE

## 2021-09-24 PROCEDURE — G0480 DRUG TEST DEF 1-7 CLASSES: HCPCS

## 2021-09-24 PROCEDURE — 31500 INSERT EMERGENCY AIRWAY: CPT

## 2021-09-24 RX ORDER — LORAZEPAM 2 MG/ML
2 INJECTION INTRAMUSCULAR ONCE
Status: COMPLETED | OUTPATIENT
Start: 2021-09-24 | End: 2021-09-24

## 2021-09-24 RX ORDER — PROPOFOL 10 MG/ML
INJECTION, EMULSION INTRAVENOUS
Status: DISCONTINUED
Start: 2021-09-24 | End: 2021-09-24 | Stop reason: HOSPADM

## 2021-09-24 RX ORDER — PROPOFOL 10 MG/ML
5-50 INJECTION, EMULSION INTRAVENOUS ONCE
Status: COMPLETED | OUTPATIENT
Start: 2021-09-25 | End: 2021-09-25

## 2021-09-24 RX ORDER — PROPOFOL 10 MG/ML
5-50 INJECTION, EMULSION INTRAVENOUS
Status: DISCONTINUED | OUTPATIENT
Start: 2021-09-24 | End: 2021-09-24 | Stop reason: HOSPADM

## 2021-09-24 RX ADMIN — LORAZEPAM 2 MG: 2 INJECTION, SOLUTION INTRAMUSCULAR; INTRAVENOUS at 21:27

## 2021-09-24 RX ADMIN — LORAZEPAM 2 MG: 2 INJECTION, SOLUTION INTRAMUSCULAR; INTRAVENOUS at 21:32

## 2021-09-24 ASSESSMENT — PAIN SCALES - GENERAL: PAINLEVEL_OUTOF10: 0

## 2021-09-24 ASSESSMENT — PULMONARY FUNCTION TESTS: PIF_VALUE: 18

## 2021-09-25 ENCOUNTER — APPOINTMENT (OUTPATIENT)
Dept: CT IMAGING | Age: 41
DRG: 816 | End: 2021-09-25
Payer: COMMERCIAL

## 2021-09-25 ENCOUNTER — APPOINTMENT (OUTPATIENT)
Dept: GENERAL RADIOLOGY | Age: 41
DRG: 816 | End: 2021-09-25
Payer: COMMERCIAL

## 2021-09-25 PROBLEM — J96.00 ACUTE RESPIRATORY FAILURE (HCC): Status: ACTIVE | Noted: 2021-09-25

## 2021-09-25 LAB
-: NORMAL
ABSOLUTE EOS #: <0.03 K/UL (ref 0–0.44)
ABSOLUTE EOS #: <0.03 K/UL (ref 0–0.44)
ABSOLUTE IMMATURE GRANULOCYTE: 0.07 K/UL (ref 0–0.3)
ABSOLUTE IMMATURE GRANULOCYTE: 0.08 K/UL (ref 0–0.3)
ABSOLUTE LYMPH #: 1.98 K/UL (ref 1.1–3.7)
ABSOLUTE LYMPH #: 2.13 K/UL (ref 1.1–3.7)
ABSOLUTE MONO #: 1 K/UL (ref 0.1–1.2)
ABSOLUTE MONO #: 1.3 K/UL (ref 0.1–1.2)
ACETAMINOPHEN LEVEL: <5 UG/ML (ref 10–30)
ALBUMIN SERPL-MCNC: 4.3 G/DL (ref 3.5–5.2)
ALBUMIN SERPL-MCNC: 4.5 G/DL (ref 3.5–5.2)
ALBUMIN/GLOBULIN RATIO: 1.4 (ref 1–2.5)
ALBUMIN/GLOBULIN RATIO: 1.5 (ref 1–2.5)
ALLEN TEST: POSITIVE
ALLEN TEST: POSITIVE
ALP BLD-CCNC: 104 U/L (ref 40–129)
ALP BLD-CCNC: 112 U/L (ref 40–129)
ALT SERPL-CCNC: 17 U/L (ref 5–41)
ALT SERPL-CCNC: 18 U/L (ref 5–41)
AMORPHOUS: NORMAL
AMPHETAMINE SCREEN URINE: POSITIVE
ANION GAP SERPL CALCULATED.3IONS-SCNC: 12 MMOL/L (ref 9–17)
ANION GAP SERPL CALCULATED.3IONS-SCNC: 14 MMOL/L (ref 9–17)
ANION GAP SERPL CALCULATED.3IONS-SCNC: 14 MMOL/L (ref 9–17)
AST SERPL-CCNC: 33 U/L
AST SERPL-CCNC: 35 U/L
BACTERIA: NORMAL
BARBITURATE SCREEN URINE: NEGATIVE
BASOPHILS # BLD: 0 % (ref 0–2)
BASOPHILS # BLD: 0 % (ref 0–2)
BASOPHILS ABSOLUTE: 0.04 K/UL (ref 0–0.2)
BASOPHILS ABSOLUTE: 0.05 K/UL (ref 0–0.2)
BENZODIAZEPINE SCREEN, URINE: POSITIVE
BILIRUB SERPL-MCNC: 0.8 MG/DL (ref 0.3–1.2)
BILIRUB SERPL-MCNC: 0.88 MG/DL (ref 0.3–1.2)
BILIRUBIN URINE: NEGATIVE
BNP INTERPRETATION: NORMAL
BUN BLDV-MCNC: 11 MG/DL (ref 6–20)
BUN BLDV-MCNC: 13 MG/DL (ref 6–20)
BUN BLDV-MCNC: 13 MG/DL (ref 6–20)
BUN/CREAT BLD: ABNORMAL (ref 9–20)
BUN/CREAT BLD: NORMAL (ref 9–20)
BUN/CREAT BLD: NORMAL (ref 9–20)
BUPRENORPHINE URINE: ABNORMAL
CALCIUM SERPL-MCNC: 8.4 MG/DL (ref 8.6–10.4)
CALCIUM SERPL-MCNC: 8.8 MG/DL (ref 8.6–10.4)
CALCIUM SERPL-MCNC: 9.1 MG/DL (ref 8.6–10.4)
CANNABINOID SCREEN URINE: NEGATIVE
CASTS UA: NORMAL /LPF (ref 0–8)
CHLORIDE BLD-SCNC: 101 MMOL/L (ref 98–107)
CHLORIDE BLD-SCNC: 101 MMOL/L (ref 98–107)
CHLORIDE BLD-SCNC: 104 MMOL/L (ref 98–107)
CO2: 21 MMOL/L (ref 20–31)
CO2: 22 MMOL/L (ref 20–31)
CO2: 24 MMOL/L (ref 20–31)
COCAINE METABOLITE, URINE: NEGATIVE
COLOR: ABNORMAL
COMMENT UA: ABNORMAL
CREAT SERPL-MCNC: 0.86 MG/DL (ref 0.7–1.2)
CREAT SERPL-MCNC: 0.94 MG/DL (ref 0.7–1.2)
CREAT SERPL-MCNC: 1 MG/DL (ref 0.7–1.2)
CRYSTALS, UA: NORMAL /HPF
DIFFERENTIAL TYPE: ABNORMAL
DIFFERENTIAL TYPE: ABNORMAL
EKG ATRIAL RATE: 109 BPM
EKG P AXIS: 70 DEGREES
EKG P-R INTERVAL: 132 MS
EKG Q-T INTERVAL: 322 MS
EKG QRS DURATION: 90 MS
EKG QTC CALCULATION (BAZETT): 433 MS
EKG R AXIS: 30 DEGREES
EKG T AXIS: 65 DEGREES
EKG VENTRICULAR RATE: 109 BPM
EOSINOPHILS RELATIVE PERCENT: 0 % (ref 1–4)
EOSINOPHILS RELATIVE PERCENT: 0 % (ref 1–4)
EPITHELIAL CELLS UA: NORMAL /HPF (ref 0–5)
ETHANOL PERCENT: <0.01 %
ETHANOL: <10 MG/DL
FIO2: 30
FIO2: 30
GFR AFRICAN AMERICAN: >60 ML/MIN
GFR NON-AFRICAN AMERICAN: >60 ML/MIN
GFR SERPL CREATININE-BSD FRML MDRD: ABNORMAL ML/MIN/{1.73_M2}
GFR SERPL CREATININE-BSD FRML MDRD: ABNORMAL ML/MIN/{1.73_M2}
GFR SERPL CREATININE-BSD FRML MDRD: NORMAL ML/MIN/{1.73_M2}
GLUCOSE BLD-MCNC: 104 MG/DL (ref 70–99)
GLUCOSE BLD-MCNC: 104 MG/DL (ref 74–100)
GLUCOSE BLD-MCNC: 90 MG/DL (ref 70–99)
GLUCOSE BLD-MCNC: 95 MG/DL (ref 70–99)
GLUCOSE URINE: NEGATIVE
HCT VFR BLD CALC: 46.3 % (ref 40.7–50.3)
HCT VFR BLD CALC: 47.5 % (ref 40.7–50.3)
HEMOGLOBIN: 15 G/DL (ref 13–17)
HEMOGLOBIN: 15.5 G/DL (ref 13–17)
IMMATURE GRANULOCYTES: 0 %
IMMATURE GRANULOCYTES: 0 %
KETONES, URINE: ABNORMAL
LACTIC ACID, WHOLE BLOOD: 1.3 MMOL/L (ref 0.7–2.1)
LEUKOCYTE ESTERASE, URINE: NEGATIVE
LIPASE: 15 U/L (ref 13–60)
LYMPHOCYTES # BLD: 10 % (ref 24–43)
LYMPHOCYTES # BLD: 11 % (ref 24–43)
MCH RBC QN AUTO: 27.9 PG (ref 25.2–33.5)
MCH RBC QN AUTO: 28.5 PG (ref 25.2–33.5)
MCHC RBC AUTO-ENTMCNC: 32.4 G/DL (ref 28.4–34.8)
MCHC RBC AUTO-ENTMCNC: 32.6 G/DL (ref 28.4–34.8)
MCV RBC AUTO: 86.2 FL (ref 82.6–102.9)
MCV RBC AUTO: 87.3 FL (ref 82.6–102.9)
MDMA URINE: ABNORMAL
METHADONE SCREEN, URINE: NEGATIVE
METHAMPHETAMINE, URINE: ABNORMAL
MODE: ABNORMAL
MODE: NORMAL
MONOCYTES # BLD: 5 % (ref 3–12)
MONOCYTES # BLD: 7 % (ref 3–12)
MRSA, DNA, NASAL: NORMAL
MUCUS: NORMAL
NEGATIVE BASE EXCESS, ART: ABNORMAL (ref 0–2)
NEGATIVE BASE EXCESS, ART: NORMAL (ref 0–2)
NITRITE, URINE: NEGATIVE
NRBC AUTOMATED: 0 PER 100 WBC
NRBC AUTOMATED: 0 PER 100 WBC
O2 DEVICE/FLOW/%: ABNORMAL
O2 DEVICE/FLOW/%: NORMAL
OPIATES, URINE: NEGATIVE
OTHER OBSERVATIONS UA: NORMAL
OXYCODONE SCREEN URINE: NEGATIVE
PATIENT TEMP: ABNORMAL
PATIENT TEMP: NORMAL
PDW BLD-RTO: 12.9 % (ref 11.8–14.4)
PDW BLD-RTO: 13 % (ref 11.8–14.4)
PH UA: 6 (ref 5–8)
PHENCYCLIDINE, URINE: NEGATIVE
PLATELET # BLD: 163 K/UL (ref 138–453)
PLATELET # BLD: 199 K/UL (ref 138–453)
PLATELET ESTIMATE: ABNORMAL
PLATELET ESTIMATE: ABNORMAL
PMV BLD AUTO: 9 FL (ref 8.1–13.5)
PMV BLD AUTO: 9.2 FL (ref 8.1–13.5)
POC HCO3: 25.9 MMOL/L (ref 21–28)
POC HCO3: 27.1 MMOL/L (ref 21–28)
POC O2 SATURATION: 95 % (ref 94–98)
POC O2 SATURATION: 98 % (ref 94–98)
POC PCO2 TEMP: ABNORMAL MM HG
POC PCO2 TEMP: NORMAL MM HG
POC PCO2: 44.9 MM HG (ref 35–48)
POC PCO2: 48.2 MM HG (ref 35–48)
POC PH TEMP: ABNORMAL
POC PH TEMP: NORMAL
POC PH: 7.36 (ref 7.35–7.45)
POC PH: 7.37 (ref 7.35–7.45)
POC PO2 TEMP: ABNORMAL MM HG
POC PO2 TEMP: NORMAL MM HG
POC PO2: 78 MM HG (ref 83–108)
POC PO2: 99.5 MM HG (ref 83–108)
POSITIVE BASE EXCESS, ART: 0 (ref 0–3)
POSITIVE BASE EXCESS, ART: 1 (ref 0–3)
POTASSIUM SERPL-SCNC: 3.9 MMOL/L (ref 3.7–5.3)
POTASSIUM SERPL-SCNC: 4 MMOL/L (ref 3.7–5.3)
POTASSIUM SERPL-SCNC: 4.1 MMOL/L (ref 3.7–5.3)
PRO-BNP: 27 PG/ML
PROPOXYPHENE, URINE: ABNORMAL
PROTEIN UA: ABNORMAL
RBC # BLD: 5.37 M/UL (ref 4.21–5.77)
RBC # BLD: 5.44 M/UL (ref 4.21–5.77)
RBC # BLD: ABNORMAL 10*6/UL
RBC # BLD: ABNORMAL 10*6/UL
RBC UA: NORMAL /HPF (ref 0–4)
RENAL EPITHELIAL, UA: NORMAL /HPF
SALICYLATE LEVEL: <1 MG/DL (ref 3–10)
SAMPLE SITE: ABNORMAL
SAMPLE SITE: NORMAL
SEG NEUTROPHILS: 82 % (ref 36–65)
SEG NEUTROPHILS: 85 % (ref 36–65)
SEGMENTED NEUTROPHILS ABSOLUTE COUNT: 15.99 K/UL (ref 1.5–8.1)
SEGMENTED NEUTROPHILS ABSOLUTE COUNT: 16.4 K/UL (ref 1.5–8.1)
SODIUM BLD-SCNC: 136 MMOL/L (ref 135–144)
SODIUM BLD-SCNC: 137 MMOL/L (ref 135–144)
SODIUM BLD-SCNC: 140 MMOL/L (ref 135–144)
SPECIFIC GRAVITY UA: 1.03 (ref 1–1.03)
SPECIMEN DESCRIPTION: NORMAL
TCO2 (CALC), ART: ABNORMAL MMOL/L (ref 22–29)
TCO2 (CALC), ART: NORMAL MMOL/L (ref 22–29)
TEST INFORMATION: ABNORMAL
TOTAL PROTEIN: 7.2 G/DL (ref 6.4–8.3)
TOTAL PROTEIN: 7.8 G/DL (ref 6.4–8.3)
TOXIC TRICYCLIC SC,BLOOD: NEGATIVE
TRICHOMONAS: NORMAL
TRICYCLIC ANTIDEPRESSANTS, UR: ABNORMAL
TROPONIN INTERP: NORMAL
TROPONIN T: NORMAL NG/ML
TROPONIN, HIGH SENSITIVITY: 18 NG/L (ref 0–22)
TURBIDITY: ABNORMAL
URINE HGB: ABNORMAL
UROBILINOGEN, URINE: NORMAL
WBC # BLD: 19.5 K/UL (ref 3.5–11.3)
WBC # BLD: 19.6 K/UL (ref 3.5–11.3)
WBC # BLD: ABNORMAL 10*3/UL
WBC # BLD: ABNORMAL 10*3/UL
WBC UA: NORMAL /HPF (ref 0–5)
YEAST: NORMAL

## 2021-09-25 PROCEDURE — 2000000000 HC ICU R&B

## 2021-09-25 PROCEDURE — 6360000002 HC RX W HCPCS

## 2021-09-25 PROCEDURE — 80053 COMPREHEN METABOLIC PANEL: CPT

## 2021-09-25 PROCEDURE — 82947 ASSAY GLUCOSE BLOOD QUANT: CPT

## 2021-09-25 PROCEDURE — 82803 BLOOD GASES ANY COMBINATION: CPT

## 2021-09-25 PROCEDURE — 6370000000 HC RX 637 (ALT 250 FOR IP): Performed by: STUDENT IN AN ORGANIZED HEALTH CARE EDUCATION/TRAINING PROGRAM

## 2021-09-25 PROCEDURE — 93005 ELECTROCARDIOGRAM TRACING: CPT | Performed by: STUDENT IN AN ORGANIZED HEALTH CARE EDUCATION/TRAINING PROGRAM

## 2021-09-25 PROCEDURE — 80307 DRUG TEST PRSMV CHEM ANLYZR: CPT

## 2021-09-25 PROCEDURE — 99291 CRITICAL CARE FIRST HOUR: CPT | Performed by: INTERNAL MEDICINE

## 2021-09-25 PROCEDURE — 2700000000 HC OXYGEN THERAPY PER DAY

## 2021-09-25 PROCEDURE — 2580000003 HC RX 258: Performed by: STUDENT IN AN ORGANIZED HEALTH CARE EDUCATION/TRAINING PROGRAM

## 2021-09-25 PROCEDURE — 94002 VENT MGMT INPAT INIT DAY: CPT

## 2021-09-25 PROCEDURE — 87040 BLOOD CULTURE FOR BACTERIA: CPT

## 2021-09-25 PROCEDURE — 6360000002 HC RX W HCPCS: Performed by: STUDENT IN AN ORGANIZED HEALTH CARE EDUCATION/TRAINING PROGRAM

## 2021-09-25 PROCEDURE — 36415 COLL VENOUS BLD VENIPUNCTURE: CPT

## 2021-09-25 PROCEDURE — 71045 X-RAY EXAM CHEST 1 VIEW: CPT

## 2021-09-25 PROCEDURE — 70450 CT HEAD/BRAIN W/O DYE: CPT

## 2021-09-25 PROCEDURE — 87641 MR-STAPH DNA AMP PROBE: CPT

## 2021-09-25 PROCEDURE — 81001 URINALYSIS AUTO W/SCOPE: CPT

## 2021-09-25 PROCEDURE — 72125 CT NECK SPINE W/O DYE: CPT

## 2021-09-25 PROCEDURE — 85025 COMPLETE CBC W/AUTO DIFF WBC: CPT

## 2021-09-25 PROCEDURE — 80048 BASIC METABOLIC PNL TOTAL CA: CPT

## 2021-09-25 PROCEDURE — 94761 N-INVAS EAR/PLS OXIMETRY MLT: CPT

## 2021-09-25 PROCEDURE — 93005 ELECTROCARDIOGRAM TRACING: CPT | Performed by: HEALTH CARE PROVIDER

## 2021-09-25 PROCEDURE — 36600 WITHDRAWAL OF ARTERIAL BLOOD: CPT

## 2021-09-25 PROCEDURE — 83605 ASSAY OF LACTIC ACID: CPT

## 2021-09-25 PROCEDURE — 73610 X-RAY EXAM OF ANKLE: CPT

## 2021-09-25 RX ORDER — DEXTROSE AND SODIUM CHLORIDE 5; .45 G/100ML; G/100ML
INJECTION, SOLUTION INTRAVENOUS CONTINUOUS
Status: DISCONTINUED | OUTPATIENT
Start: 2021-09-25 | End: 2021-09-29 | Stop reason: HOSPADM

## 2021-09-25 RX ORDER — MIDAZOLAM HYDROCHLORIDE 2 MG/2ML
5 INJECTION, SOLUTION INTRAMUSCULAR; INTRAVENOUS ONCE
Status: DISCONTINUED | OUTPATIENT
Start: 2021-09-25 | End: 2021-09-25

## 2021-09-25 RX ORDER — MIDAZOLAM HYDROCHLORIDE 2 MG/2ML
5 INJECTION, SOLUTION INTRAMUSCULAR; INTRAVENOUS ONCE
Status: DISCONTINUED | OUTPATIENT
Start: 2021-09-25 | End: 2021-09-29 | Stop reason: HOSPADM

## 2021-09-25 RX ORDER — FAMOTIDINE 20 MG/1
20 TABLET, FILM COATED ORAL 2 TIMES DAILY
Status: DISCONTINUED | OUTPATIENT
Start: 2021-09-25 | End: 2021-09-29 | Stop reason: HOSPADM

## 2021-09-25 RX ORDER — SODIUM CHLORIDE 9 MG/ML
25 INJECTION, SOLUTION INTRAVENOUS PRN
Status: DISCONTINUED | OUTPATIENT
Start: 2021-09-25 | End: 2021-09-29 | Stop reason: HOSPADM

## 2021-09-25 RX ORDER — ACETAMINOPHEN 325 MG/1
650 TABLET ORAL EVERY 6 HOURS PRN
Status: DISCONTINUED | OUTPATIENT
Start: 2021-09-25 | End: 2021-09-29 | Stop reason: HOSPADM

## 2021-09-25 RX ORDER — MIDAZOLAM HYDROCHLORIDE 2 MG/2ML
4 INJECTION, SOLUTION INTRAMUSCULAR; INTRAVENOUS ONCE
Status: COMPLETED | OUTPATIENT
Start: 2021-09-25 | End: 2021-09-25

## 2021-09-25 RX ORDER — SODIUM CHLORIDE, SODIUM LACTATE, POTASSIUM CHLORIDE, AND CALCIUM CHLORIDE .6; .31; .03; .02 G/100ML; G/100ML; G/100ML; G/100ML
1000 INJECTION, SOLUTION INTRAVENOUS ONCE
Status: COMPLETED | OUTPATIENT
Start: 2021-09-25 | End: 2021-09-25

## 2021-09-25 RX ORDER — PROPOFOL 10 MG/ML
INJECTION, EMULSION INTRAVENOUS
Status: COMPLETED
Start: 2021-09-25 | End: 2021-09-25

## 2021-09-25 RX ORDER — SODIUM CHLORIDE 0.9 % (FLUSH) 0.9 %
5-40 SYRINGE (ML) INJECTION EVERY 12 HOURS SCHEDULED
Status: DISCONTINUED | OUTPATIENT
Start: 2021-09-25 | End: 2021-09-29 | Stop reason: HOSPADM

## 2021-09-25 RX ORDER — FENTANYL CITRATE 50 UG/ML
INJECTION, SOLUTION INTRAMUSCULAR; INTRAVENOUS
Status: COMPLETED
Start: 2021-09-25 | End: 2021-09-25

## 2021-09-25 RX ORDER — ONDANSETRON 2 MG/ML
4 INJECTION INTRAMUSCULAR; INTRAVENOUS EVERY 6 HOURS PRN
Status: DISCONTINUED | OUTPATIENT
Start: 2021-09-25 | End: 2021-09-29 | Stop reason: HOSPADM

## 2021-09-25 RX ORDER — ONDANSETRON 4 MG/1
4 TABLET, ORALLY DISINTEGRATING ORAL EVERY 8 HOURS PRN
Status: DISCONTINUED | OUTPATIENT
Start: 2021-09-25 | End: 2021-09-29 | Stop reason: HOSPADM

## 2021-09-25 RX ORDER — SODIUM CHLORIDE 0.9 % (FLUSH) 0.9 %
5-40 SYRINGE (ML) INJECTION PRN
Status: DISCONTINUED | OUTPATIENT
Start: 2021-09-25 | End: 2021-09-29 | Stop reason: HOSPADM

## 2021-09-25 RX ORDER — POLYETHYLENE GLYCOL 3350 17 G/17G
17 POWDER, FOR SOLUTION ORAL DAILY PRN
Status: DISCONTINUED | OUTPATIENT
Start: 2021-09-25 | End: 2021-09-29 | Stop reason: HOSPADM

## 2021-09-25 RX ORDER — ONDANSETRON 4 MG/1
4 TABLET, ORALLY DISINTEGRATING ORAL ONCE
Status: ACTIVE | OUTPATIENT
Start: 2021-09-25

## 2021-09-25 RX ORDER — ACETAMINOPHEN 650 MG/1
650 SUPPOSITORY RECTAL EVERY 6 HOURS PRN
Status: DISCONTINUED | OUTPATIENT
Start: 2021-09-25 | End: 2021-09-29 | Stop reason: HOSPADM

## 2021-09-25 RX ORDER — LORAZEPAM 2 MG/ML
INJECTION INTRAMUSCULAR
Status: COMPLETED
Start: 2021-09-25 | End: 2021-09-25

## 2021-09-25 RX ORDER — PROPOFOL 10 MG/ML
5-50 INJECTION, EMULSION INTRAVENOUS
Status: DISCONTINUED | OUTPATIENT
Start: 2021-09-25 | End: 2021-09-27

## 2021-09-25 RX ORDER — FENTANYL CITRATE 50 UG/ML
50 INJECTION, SOLUTION INTRAMUSCULAR; INTRAVENOUS ONCE
Status: COMPLETED | OUTPATIENT
Start: 2021-09-25 | End: 2021-09-25

## 2021-09-25 RX ORDER — LORAZEPAM 2 MG/ML
4 INJECTION INTRAMUSCULAR ONCE
Status: COMPLETED | OUTPATIENT
Start: 2021-09-25 | End: 2021-09-25

## 2021-09-25 RX ADMIN — SODIUM CHLORIDE, PRESERVATIVE FREE 10 ML: 5 INJECTION INTRAVENOUS at 08:05

## 2021-09-25 RX ADMIN — CEFTRIAXONE SODIUM 1000 MG: 1 INJECTION, POWDER, FOR SOLUTION INTRAMUSCULAR; INTRAVENOUS at 06:25

## 2021-09-25 RX ADMIN — FENTANYL CITRATE 50 MCG: 50 INJECTION, SOLUTION INTRAMUSCULAR; INTRAVENOUS at 12:02

## 2021-09-25 RX ADMIN — ENOXAPARIN SODIUM 40 MG: 40 INJECTION SUBCUTANEOUS at 09:15

## 2021-09-25 RX ADMIN — PROPOFOL 45 MCG/KG/MIN: 10 INJECTION, EMULSION INTRAVENOUS at 06:57

## 2021-09-25 RX ADMIN — SODIUM CHLORIDE, POTASSIUM CHLORIDE, SODIUM LACTATE AND CALCIUM CHLORIDE 1000 ML: 600; 310; 30; 20 INJECTION, SOLUTION INTRAVENOUS at 04:36

## 2021-09-25 RX ADMIN — AZITHROMYCIN MONOHYDRATE 500 MG: 500 INJECTION, POWDER, LYOPHILIZED, FOR SOLUTION INTRAVENOUS at 06:57

## 2021-09-25 RX ADMIN — PROPOFOL 50 MCG/KG/MIN: 10 INJECTION, EMULSION INTRAVENOUS at 15:19

## 2021-09-25 RX ADMIN — PROPOFOL 40 MCG/KG/MIN: 10 INJECTION, EMULSION INTRAVENOUS at 02:05

## 2021-09-25 RX ADMIN — MIDAZOLAM HYDROCHLORIDE 4 MG: 1 INJECTION, SOLUTION INTRAMUSCULAR; INTRAVENOUS at 16:06

## 2021-09-25 RX ADMIN — PROPOFOL 50 MCG/KG/MIN: 10 INJECTION, EMULSION INTRAVENOUS at 20:36

## 2021-09-25 RX ADMIN — PROPOFOL 45 MCG/KG/MIN: 10 INJECTION, EMULSION INTRAVENOUS at 04:07

## 2021-09-25 RX ADMIN — MIDAZOLAM HYDROCHLORIDE 4 MG: 1 INJECTION, SOLUTION INTRAMUSCULAR; INTRAVENOUS at 14:19

## 2021-09-25 RX ADMIN — FAMOTIDINE 20 MG: 20 TABLET, FILM COATED ORAL at 20:36

## 2021-09-25 RX ADMIN — LORAZEPAM 4 MG: 2 INJECTION INTRAMUSCULAR; INTRAVENOUS at 21:04

## 2021-09-25 RX ADMIN — FAMOTIDINE 20 MG: 20 TABLET, FILM COATED ORAL at 10:28

## 2021-09-25 RX ADMIN — DEXTROSE AND SODIUM CHLORIDE: 5; 450 INJECTION, SOLUTION INTRAVENOUS at 15:20

## 2021-09-25 RX ADMIN — DEXTROSE AND SODIUM CHLORIDE: 5; 450 INJECTION, SOLUTION INTRAVENOUS at 04:06

## 2021-09-25 RX ADMIN — DEXTROSE AND SODIUM CHLORIDE: 5; 450 INJECTION, SOLUTION INTRAVENOUS at 23:42

## 2021-09-25 RX ADMIN — LORAZEPAM 4 MG: 2 INJECTION INTRAMUSCULAR at 21:04

## 2021-09-25 RX ADMIN — PROPOFOL 50 MCG/KG/MIN: 10 INJECTION, EMULSION INTRAVENOUS at 12:05

## 2021-09-25 ASSESSMENT — PULMONARY FUNCTION TESTS
PIF_VALUE: 19
PIF_VALUE: 22
PIF_VALUE: 27
PIF_VALUE: 20
PIF_VALUE: 19
PIF_VALUE: 16
PIF_VALUE: 29
PIF_VALUE: 19
PIF_VALUE: 18
PIF_VALUE: 20
PIF_VALUE: 26
PIF_VALUE: 20

## 2021-09-25 ASSESSMENT — PAIN SCALES - GENERAL
PAINLEVEL_OUTOF10: 8
PAINLEVEL_OUTOF10: 0
PAINLEVEL_OUTOF10: 0

## 2021-09-25 NOTE — PROGRESS NOTES
Ventilator Bronchodilator assessment    Breath sounds: Clear  Inspiratory Pressure: 16  Plateau Pressure: 16    Patient assessed at level 1          [x]    Bronchodilator Assessment    BRONCHODILATOR ASSESSMENT SCORE  Score 0 (Home) 1 2 3 4   Breath Sounds   []  Chronic Ventilator: Patient at baseline [x]  Mild Wheezes/ Clear []  Intermittent wheezes with good air entry []  Bilateral/unilateral wheezing with diminished air entry []  Insp/Exp wheeze and/or poor aeration   Ventilator Pressures   []  Chronic Ventilator [x]  Insp. Pressure less than 25 cm H20 [x]  Insp. Pressure less than 25 cm H20 []  Insp. Pressure exceeds 25 cm H20 []  Insp.  Pressure exceeds 30 cm H20   Plateau Pressure []  NA   [x]  Plateau Pressure less than 4  []  Plateau Pressure less than or equal to 5 []  Plateau Pressure greater than or equal to 6 []  Plateau Pressure greater than or equal to 8       Fercho Christian RCP  5:01 AM

## 2021-09-25 NOTE — PROGRESS NOTES
Pt arrives via life flight transfer from [facility] intubated with a #7.5 cuffed ETT at 26cm at lip, placed on charted vent settings, appropriate monitoring applied to pt.

## 2021-09-25 NOTE — PROGRESS NOTES
Critical Care Team - Daily Progress Note      Date and time: 9/25/2021 9:00 AM  Patient's name:  Helio Camilla 140 Record Number: [id number]  Patient's account/billing number: [de-identified]  Patient's YOB: 1980  Age: 42 y.o. Date of Admission: 9/24/2021 11:43 PM  Length of stay during current admission: 0      Primary Care Physician: Mk Dubon MD  ICU Attending Physician: Dr. Robbins Ramp     Code Status: Full Code    Reason for ICU admission:   Chief Complaint   Patient presents with    Drug Overdose     Pt via life flight from [facility] ([facility] for Heroin overdose. Pt received 16mg of narcan with no response.  Pt intubated upon arrival.        Subjective:     OVERNIGHT EVENTS:   - No acute events overnight     This morning:  - Pt has red tinged urine with sediment   - He is responsive to his name being called and is able to lightly squeeze my hand  - Pt has not tolerated sedation holiday well - became agitated       Plan for today:  - Continue to wean as tolerated       AWAKE & FOLLOWING COMMANDS:  [] No   [x] Yes    CURRENT VENTILATION STATUS:     [x] Ventilator  [] BIPAP  [] Nasal Cannula [] Room Air      IF INTUBATED, ET TUBE MARKING AT LOWER LIP:      21 cms    SECRETIONS Amount:  [] Small [] Moderate  [] Large  [x] None  Color:     [] White [] Colored  [] Bloody    SEDATION:  RAAS Score:  [x] Propofol gtt  [] Versed gtt  [] Ativan gtt   [] No Sedation    PARALYZED:  [x] No    [] Yes    DIARRHEA:                [x] No                [] Yes  (C. Difficile status: [] positive                                                                                                                       [] negative                                                                                                                     [] pending)    VASOPRESSORS:  [x] No    [] Yes    If yes -   [] Levophed       [] Dopamine     [] Vasopressin       [] Dobutamine  [] Phenylephrine         [] Epinephrine    CENTRAL LINES: [x] No   [] Yes   (Date of Insertion:   )           If yes -     [] Right IJ     [] Left IJ [] Right Femoral [] Left Femoral                   [] Right Subclavian [] Left Subclavian       HOWARD'S CATHETER:   [] No   [x] Yes  (Date of Insertion:   )     URINE OUTPUT:            [x] Good   [] Low              [] Anuric    REVIEW OF SYSTEMS:  · Constitutional: Negative for Fever, chills  · Eyes: Negative for visual changes, diplopia  · ENT: Negative for mouth sores, sore throat. · Cardiovascular: Negative for lightheadedness ,chest pain, palpitations   · Respiratory:Negative for Shortness of breath,cough or wheezing. · Gastrointestinal: Negative for nausea/vomiting, change in bowel habits, abdominal pain  · Genitourinary:Negative for change in bladder habits, dysuria, hematuria.   · Musculoskeletal: Negative for joint pain   · Neurological: Negative for headache, change in muscle strength numbness/tingling      OBJECTIVE:     VITAL SIGNS:  /67   Pulse 97   Temp 99.1 °F (37.3 °C) (Axillary)   Resp 16   Wt 178 lb 9.2 oz (81 kg)   SpO2 100%   BMI 24.91 kg/m²   Tmax over 24 hours:  Temp (24hrs), Av.1 °F (37.3 °C), Min:97.8 °F (36.6 °C), Max:100.6 °F (38.1 °C)      Patient Vitals for the past 8 hrs:   BP Temp Temp src Pulse Resp SpO2 Weight   21 0800 107/67 99.1 °F (37.3 °C) Axillary 97 16 100 % --   21 0748 -- -- -- -- 13 100 % --   21 0737 -- -- -- 94 16 98 % --   21 0730 102/69 -- -- 94 16 100 % --   21 0700 103/67 -- -- 100 16 97 % --   21 0600 103/65 98.8 °F (37.1 °C) Oral 98 16 97 % --   21 0500 112/65 -- -- 104 16 97 % --   21 0405 125/82 100.6 °F (38.1 °C) Oral 112 14 95 % 178 lb 9.2 oz (81 kg)   21 0400 (!) 92/59 -- -- 120 13 96 % --   21 0356 -- -- -- 107 14 100 % --   21 0231 123/84 -- -- 109 14 100 % --   21 0135 134/85 -- -- 106 13 -- --   21 0116 (!) 142/82 -- -- 105 14 99 % --   21 0108 (!) 152/107 -- -- 124 18 98 % --   09/25/21 0102 -- -- -- 114 -- -- 164 lb 14.5 oz (74.8 kg)         Intake/Output Summary (Last 24 hours) at 9/25/2021 0900  Last data filed at 9/25/2021 0600  Gross per 24 hour   Intake --   Output 90 ml   Net -90 ml     Date 09/25/21 0000 - 09/25/21 2359   Shift 0177-6312 9054-2922 1142-5458 24 Hour Total   INTAKE   Shift Total(mL/kg)       OUTPUT   Urine(mL/kg/hr) 90(0.1)   90   Shift Total(mL/kg) 90(1.1)   90(1.1)   Weight (kg) 81 81 81 81     Wt Readings from Last 3 Encounters:   09/25/21 178 lb 9.2 oz (81 kg)   09/24/21 165 lb (74.8 kg)   10/26/20 167 lb (75.8 kg)     Body mass index is 24.91 kg/m². PHYSICAL EXAM:  Constitutional: intubated, sedated but responsive   HEENT: PERRLA, EOMI, sclera clear, anicteric  Respiratory: clear to auscultation, no wheezes or rales and unlabored breathing. Cardiovascular: regular rate and rhythm, normal S1, S2, no murmur noted and 2+ pulses throughout  Abdomen: soft, nontender, nondistended, no masses or organomegaly  NEUROLOGIC: Cranial nerves II-XII are grossly intact. Motor is 5 out of 5 bilaterally. Sensory is intact. Extremities:  peripheral pulses normal, no pedal edema,.       Any additional physical findings:      MEDICATIONS:  Scheduled Meds:   sodium chloride flush  5-40 mL IntraVENous 2 times per day    enoxaparin  40 mg SubCUTAneous Daily    famotidine  20 mg Oral BID     Continuous Infusions:   sodium chloride      dextrose 5 % and 0.45 % NaCl 125 mL/hr at 09/25/21 0406    propofol 45 mcg/kg/min (09/25/21 0657)     PRN Meds:   sodium chloride flush, 5-40 mL, PRN  sodium chloride, 25 mL, PRN  ondansetron, 4 mg, Q8H PRN   Or  ondansetron, 4 mg, Q6H PRN  polyethylene glycol, 17 g, Daily PRN  acetaminophen, 650 mg, Q6H PRN   Or  acetaminophen, 650 mg, Q6H PRN        SUPPORT DEVICES: [x] Ventilator [] BIPAP  [] Nasal Cannula [] Room Air    VENT SETTINGS (Comprehensive) (if applicable):  Vent Information  $Ventilation: $Subsequent Day  Skin Assessment: Clean, dry, & intact  Vent Type: Servo i  Vent Mode: PRVC  Vt Ordered: 530 mL  Rate Set: 16 bmp  FiO2 : 30 %  SpO2: 100 %  SpO2/FiO2 ratio: 333.33  Sensitivity: 5  PEEP/CPAP: 5  I Time/ I Time %: 0.9 s  Humidification Source: HME  Additional Respiratory  Assessments  Pulse: 97  Resp: 16  SpO2: 100 %  End Tidal CO2: 40 (%)  Position: Supine  Humidification Source: HME  Oral Care Completed?: Yes  Oral Care: Mouthwash, Mouth moisturizer, Mouth suctioned  Skin barrier applied: Yes    ABGs:     Lab Results   Component Value Date    NTJ5MXW NOT REPORTED 09/25/2021    FIO2 30.0 09/25/2021     Lactic Acid: No results found for: LACTA      DATA:  Complete Blood Count:   Recent Labs     09/25/21 0119 09/25/21 0438   WBC 19.5* 19.6*   HGB 15.5 15.0   MCV 87.3 86.2    163   RBC 5.44 5.37   HCT 47.5 46.3   MCH 28.5 27.9   MCHC 32.6 32.4   RDW 13.0 12.9   MPV 9.0 9.2        PT/INR:  No results found for: PROTIME, INR  PTT:  No results found for: APTT, PTT    Basal Metabolic Profile:   Recent Labs     09/25/21 0119 09/25/21 0438    137   K 4.1 3.9   BUN 13 13   CREATININE 1.00 0.86    101   CO2 21 22      Magnesium:   Lab Results   Component Value Date    MG 2.2 11/12/2019     Phosphorus: No results found for: PHOS  S. Calcium:  Recent Labs     09/25/21 0438   CALCIUM 8.8     S. Ionized Calcium:No results for input(s): IONCA in the last 72 hours.       Urinalysis:   Lab Results   Component Value Date    NITRU NEGATIVE 09/25/2021    COLORU Dark Yellow 09/25/2021    PHUR 6.0 09/25/2021    WBCUA 2 TO 5 09/25/2021    RBCUA 5 TO 10 09/25/2021    MUCUS NOT REPORTED 09/25/2021    TRICHOMONAS NOT REPORTED 09/25/2021    YEAST NOT REPORTED 09/25/2021    BACTERIA NOT REPORTED 09/25/2021    SPECGRAV 1.029 09/25/2021    LEUKOCYTESUR NEGATIVE 09/25/2021    UROBILINOGEN Normal 09/25/2021    BILIRUBINUR NEGATIVE 09/25/2021    GLUCOSEU NEGATIVE 09/25/2021    KETUA TRACE 09/25/2021    AMORPHOUS NOT    GI/Nutrition:  · NPO   · Bowel regimen:glycolax prn      ID:  · WBC 19  · Rocephin IV   · Azithromycin IV      Endo:  · Monitor glucose     Musculoskeletal:  · Injuries: right ankle xr negative   · Restrictions: none      Lines/Drains:  · PIV   · ET tube   · Driscoll      Prophylaxis:  · DVT: lovenox, SCD  · GI: Pepcid      Outside Images:  · CT head, CT cervical spine negative     Plan for today:  - Continue to wean as tolerated       Autumn Gillis MD            Department of Internal Medicine/ Critical care  [facility]             9/25/2021, 9:00 AM

## 2021-09-25 NOTE — PROGRESS NOTES
The transport originated from [facility]. Pt. was transported to Cath lab. Assisting with the transport was COLLIN Marin. Appropriate devices were applied to monitor the patient's condition during transport. Patient transported  via 40% O2 via ventilator. Patient tolerated well.         Larina Scheuermann, RCP  6:48 PM

## 2021-09-25 NOTE — ED PROVIDER NOTES
eMERGENCY dEPARTMENT eNCOUnter        [address]    Chief Complaint   Patient presents with    Drug Overdose     Pt overdosed per EMS with a total of 12 mg of Narcan being given. ROSE Luke is a 42 y.o. male who presents squad because of apparent opioid drug overdose with 12 mg of Narcan given. The patient did improve breathing after this was given. He is combative and agitated. He is posturing in a decerebrate manner. Patient unable to provide any history. REVIEW OF SYSTEMS    Patient unconscious and no review of systems obtained. PAST MEDICAL HISTORY    Past Medical History:   Diagnosis Date    Anxiety     Drug abuse ([facility] [address]     Restless leg        SURGICAL HISTORY    History reviewed. No pertinent surgical history. CURRENT MEDICATIONS    Current Outpatient Rx   Medication Sig Dispense Refill    ibuprofen (IBU) 600 MG tablet Take 1 tablet by mouth every 6 hours as needed for Pain 30 tablet 0    buPROPion (WELLBUTRIN SR) 150 MG extended release tablet Take 1 tablet by mouth 2 times daily 14 tablet 0    gabapentin (NEURONTIN) 800 MG tablet Take 1 tablet by mouth three times daily for 14 days. . (Patient taking differently: Take 800 mg by mouth 4 times daily. Bing Obrien ) 21 tablet 0       ALLERGIES    No Known Allergies    FAMILY HISTORY    History reviewed. No pertinent family history.     SOCIAL HISTORY    Social History     Socioeconomic History    Marital status: Single     Spouse name: None    Number of children: None    Years of education: None    Highest education level: None   Occupational History    None   Tobacco Use    Smoking status: Current Every Day Smoker     Packs/day: 0.50     Years: 10.00     Pack years: 5.00     Types: Cigarettes    Smokeless tobacco: Former User   Substance and Sexual Activity    Alcohol use: No    Drug use: No     Comment: Clean since June 2016    Sexual activity: None   Other Topics Concern    None   Social History Narrative    None Social Determinants of Health     Financial Resource Strain:     Difficulty of Paying Living Expenses:    Food Insecurity:     Worried About Running Out of Food in the Last Year:     [address] in the Last Year:    Transportation Needs:     Lack of Transportation (Medical):  Lack of Transportation (Non-Medical):    Physical Activity:     Days of Exercise per Week:     Minutes of Exercise per Session:    Stress:     Feeling of Stress :    Social Connections:     Frequency of Communication with Friends and Family:     Frequency of Social Gatherings with Friends and Family:     Attends Religion Services:     Active Member of Clubs or Organizations:     Attends Club or Organization Meetings:     Marital Status:    Intimate Partner Violence:     Fear of Current or Ex-Partner:     Emotionally Abused:     Physically Abused:     Sexually Abused:        PHYSICAL EXAM    VITAL SIGNS: /74   Pulse 108   Temp 97.8 °F (36.6 °C) (Temporal)   Resp 18   Wt 165 lb (74.8 kg)   SpO2 99%   BMI 23.01 kg/m²   Constitutional:  Well developed, ill appearing, combative 43 yo male, GCS = 6-7   Eyes:  PERRL dilated at 4 mm and reactive, conjunctiva not injected. HENT: Head is normocephalic with no apparent trauma. Nares patent with no epistaxis or rhinorrhea. Oropharynx shows mostly edentulous with some cavities present lower. Tongue and uvula are controlled movement and gag reflex intact. Respiratory: Breathing on his own. Clear lung sounds. No crackles or rhonchi. No chest wall injury or crepitance. Cardiovascular: Rapid rate and rhythm at 138/min. No distinct murmur. GI: The abdomen is not distended. Active bowel sounds. There is no guarding. No rebound tenderness. : No genital irritation or injury. No scrotal edema or masses. No inguinal bulge. Musculoskeletal: Decerebrate type posturing with movement of the extremities. Multiple needle tracks.   No acute deformity of the ER transfer [address] [place] [address]. Discussed with ER doctor. Patient stabilized in the ER. Propofol drip maintained. ED Medications administered this visit:    Medications   propofol injection (has no administration in time range)   propofol 1000 MG/100ML injection (has no administration in time range)   LORazepam (ATIVAN) injection 2 mg (2 mg IntraVENous Given 9/24/21 2127)   LORazepam (ATIVAN) injection 2 mg (2 mg IntraVENous Given 9/24/21 2132)       New Prescriptions from this visit:    New Prescriptions    No medications on file       Follow-up: Transfer to [address] [place] [address] by [facility]     Final Impression:   1.  Opiate overdose, accidental or unintentional, initial encounter [facility])               (Please note that portions of this note were completed with a voice recognition program.  Efforts were made to edit the dictations but occasionally words are mis-transcribed.)          Alison Holm DO  09/25/21 [id number]

## 2021-09-25 NOTE — PROGRESS NOTES
At 2115, 2 mg nasal narcan & 4 mg of IV ativan given for agitation. At 2125, 2 mg of IV narcan given. At 2142, 2 mg of versed given IV. At 2150, 20 mg of etomidate & 1 mg versed given IV. At 2153, 80 mg IV succinylcholine given. At 2155, ETT placed via rapid sequence intubation, color changed noted. At 2200, 1 mg IV versed given for agitation. Propofol drip started at 20 mcg/kg/min at 2201. At 2003,  ETCO2 30. At 2208, 2mg IV versed given for agitation.

## 2021-09-25 NOTE — ED PROVIDER NOTES
[facility] ED  Emergency Department Encounter  Emergency Medicine Resident     Pt Name: Maritza Lopez  MRN: [id number]  [place] 1980  Date of evaluation: 9/24/21  PCP:  Xavi Skinner MD    [address] [place] [address]       Chief Complaint   Patient presents with    Drug Overdose     Pt via life flight from [facility] for Heroin overdose. Pt received 16mg of narcan with no response. Pt intubated upon arrival.        HISTORY OFPRESENT ILLNESS  (Location/Symptom, Timing/Onset, Context/Setting, Quality, Duration, Modifying Factors,Severity.)      Maritza Lopez is a 42 y.o. male from [facility] via [address] after reported heroin overdose. Patient was found down and unresponsive. Received a total of 16 mg of Narcan between EMS and [facility] emergency department. Had no response to the medication and was intubated for airway protection. No work-up was performed prior to transfer via [facility]. On arrival, patient is hemodynamically stable, minimally responsive. PAST MEDICAL / SURGICAL / SOCIAL / FAMILY HISTORY      has a past medical history of Anxiety, Drug abuse ([address] and Restless leg.     Unable to elucidate surgical history due to patient intubation    Social History     Socioeconomic History    Marital status: Single     Spouse name: Not on file    Number of children: Not on file    Years of education: Not on file    Highest education level: Not on file   Occupational History    Not on file   Tobacco Use    Smoking status: Current Every Day Smoker     Packs/day: 0.50     Years: 10.00     Pack years: 5.00     Types: Cigarettes    Smokeless tobacco: Former User   Substance and Sexual Activity    Alcohol use: No    Drug use: No     Comment: Clean since June 2016    Sexual activity: Not on file   Other Topics Concern    Not on file   Social History Narrative    Not on file     Social Determinants of Health     Financial Resource Strain:     Difficulty of Paying Living Expenses:    Food Insecurity:     Worried About Running Out of Food in the Last Year:     [address] in the Last Year:    Transportation Needs:     Lack of Transportation (Medical):  Lack of Transportation (Non-Medical):    Physical Activity:     Days of Exercise per Week:     Minutes of Exercise per Session:    Stress:     Feeling of Stress :    Social Connections:     Frequency of Communication with Friends and Family:     Frequency of Social Gatherings with Friends and Family:     Attends [employer] Services:     Active Member of Clubs or Organizations:     Attends Club or Organization Meetings:     Marital Status:    Intimate Partner Violence:     Fear of Current or Ex-Partner:     Emotionally Abused:     Physically Abused:     Sexually Abused:        Unable to obtain family history    Allergies:  Patient has no known allergies. Home Medications:  Prior to Admission medications    Medication Sig Start Date End Date Taking? Authorizing Provider   ibuprofen (IBU) 600 MG tablet Take 1 tablet by mouth every 6 hours as needed for Pain 11/21/18   Charlotte Cat MD   buPROPion [facility] SR) 150 MG extended release tablet Take 1 tablet by mouth 2 times daily 10/23/18   Wicho Chavez MD   gabapentin (NEURONTIN) 800 MG tablet Take 1 tablet by mouth three times daily for 14 days. .  Patient taking differently: Take 800 mg by mouth 4 times daily. . 10/9/18 10/26/20  Pastor Agarwal MD       REVIEW OFSYSTEMS    (2-9 systems for level 4, 10 or more for level 5)      Review of Systems   Unable to perform ROS: Intubated       PHYSICAL EXAM   (up to 7 for level 4, 8 or more forlevel 5)      INITIAL VITALS:     Vitals:    09/25/21 0135   BP: 134/85   Pulse: 106   Resp: 13   SpO2: 99%         Physical Exam  Constitutional:       General: He is not in acute distress. Appearance: He is ill-appearing. HENT:      Head: Normocephalic and atraumatic.       Right Ear: Tympanic membrane, ear canal and external ear normal.      Left Ear: Tympanic membrane, ear canal and external ear normal.      Nose: Nose normal.      Mouth/Throat:      Pharynx: Oropharynx is clear. Comments: ET tube and OG tube in place  Eyes:      Conjunctiva/sclera: Conjunctivae normal.      Pupils: Pupils are equal, round, and reactive to light. Cardiovascular:      Rate and Rhythm: Regular rhythm. Tachycardia present. Pulses: Normal pulses. Heart sounds: Normal heart sounds. Pulmonary:      Effort: Pulmonary effort is normal.      Breath sounds: Normal breath sounds. No stridor. No wheezing or rales. Abdominal:      General: There is no distension. Palpations: Abdomen is soft. Tenderness: There is no guarding. Musculoskeletal:      Cervical back: Neck supple. No rigidity. Left lower leg: No edema. Comments: Right ankle erythema and edema   Skin:     General: Skin is warm and dry. Capillary Refill: Capillary refill takes less than 2 seconds. Neurological:      Comments: GCS6T: E1 V1 M4. Draws all extremities to pain. Babinski reflex negative bilateral lower extremities.              DIFFERENTIAL  DIAGNOSIS     PLAN (LABS / IMAGING / EKG):  Orders Placed This Encounter   Procedures    CT Head WO Contrast    CT CERVICAL SPINE WO CONTRAST    XR CHEST PORTABLE    XR ANKLE RIGHT (MIN 3 VIEWS)    CBC Auto Differential    Comprehensive Metabolic Panel w/ Reflex to MG    Lipase    Troponin    Brain Natriuretic Peptide    Urinalysis Reflex to Culture    TOX SCR, BLD, ED    Urine Drug Screen    Microscopic Urinalysis    Height and weight    Telemetry monitoring - continuous duration    Inpatient consult to Critical Care    ABG draw    Manual Mechanical Ventilation    Initiate Oxygen Therapy Protocol    Pulse oximetry, continuous    End Tidal CO2 Continuous    Arterial Blood Gas, POC    EKG 12 Lead    PATIENT STATUS (FROM ED OR OR/PROCEDURAL) Inpatient       MEDICATIONS ORDERED:  Orders Placed This Encounter   Medications    propofol injection    propofol 1000 MG/100ML injection     Richard Smyth: cabinet override       DDX: Opioid overdose, other intoxicants, stroke, intracranial trauma, neck trauma    Initial MDM/Plan: 42 y.o. male found down with presumed heroin overdose based on history. Unresponsive to Narcan. Intubated for airway protection. Etiology of patient's mental status is unknown known. Will obtain CT of the head and neck to assess for intracranial abnormalities and cervical spine injury. Will obtain lab work.     DIAGNOSTIC RESULTS / EMERGENCYDEPARTMENT COURSE / MDM     LABS:  Labs Reviewed   CBC WITH AUTO DIFFERENTIAL - Abnormal; Notable for the following components:       Result Value    WBC 19.5 (*)     Seg Neutrophils 85 (*)     Lymphocytes 10 (*)     Eosinophils % 0 (*)     Segs Absolute 16.40 (*)     All other components within normal limits   URINE RT REFLEX TO CULTURE - Abnormal; Notable for the following components:    Color, UA Dark Yellow (*)     Turbidity UA Turbid (*)     Ketones, Urine TRACE (*)     Urine Hgb TRACE (*)     Protein, UA TRACE (*)     All other components within normal limits   TOX SCR, BLD, ED - Abnormal; Notable for the following components:    Acetaminophen Level <5 (*)     Salicylate Lvl <1 (*)     All other components within normal limits   URINE DRUG SCREEN - Abnormal; Notable for the following components:    Amphetamine Screen, Ur POSITIVE (*)     Benzodiazepine Screen, Urine POSITIVE (*)     All other components within normal limits   COMPREHENSIVE METABOLIC PANEL W/ REFLEX TO MG FOR LOW K   LIPASE   TROPONIN   BRAIN NATRIURETIC PEPTIDE   MICROSCOPIC URINALYSIS   ARTERIAL BLOOD GAS, POC         RADIOLOGY:  CT Head WO Contrast    Result Date: 9/25/2021  EXAMINATION: CT OF THE HEAD WITHOUT CONTRAST  9/25/2021 1:26 am TECHNIQUE: CT of the head was performed without the administration of intravenous contrast. Dose modulation, iterative reconstruction, and/or weight based adjustment of the mA/kV was utilized to reduce the radiation dose to as low as reasonably achievable. COMPARISON: None. HISTORY: ORDERING SYSTEM PROVIDED HISTORY: Found down, unresponsive TECHNOLOGIST PROVIDED HISTORY: Found down, unresponsive Decision Support Exception - unselect if not a suspected or confirmed emergency medical condition->Emergency Medical Condition (MA) Reason for Exam: overdose pt vented FINDINGS: Suboptimal head positioning challenge evaluation and degrades image quality. BRAIN/VENTRICLES: There is no acute intracranial hemorrhage, mass effect or midline shift. No abnormal extra-axial fluid collection. The gray-white differentiation is maintained without evidence of an acute infarct. There is no evidence of hydrocephalus. ORBITS: The visualized portion of the orbits demonstrate no acute abnormality. SINUSES: Moderate paranasal sinus disease identified with air-fluid level identified involving the right maxillary sinus. This could be related to intubation status. SOFT TISSUES/SKULL:  No acute abnormality of the visualized skull or soft tissues. No acute bleed or midline shift. No definite loss of gray differentiation. If there is persistent concern for anoxic hypoxic injury, consider short-term repeat head CT such is in 6-8 hours     CT CERVICAL SPINE WO CONTRAST    Result Date: 9/25/2021  EXAMINATION: CT OF THE CERVICAL SPINE WITHOUT CONTRAST 9/25/2021 1:26 am TECHNIQUE: CT of the cervical spine was performed without the administration of intravenous contrast. Multiplanar reformatted images are provided for review. Dose modulation, iterative reconstruction, and/or weight based adjustment of the mA/kV was utilized to reduce the radiation dose to as low as reasonably achievable. COMPARISON: None.  HISTORY: ORDERING SYSTEM PROVIDED HISTORY: Found down, unresponsive TECHNOLOGIST PROVIDED HISTORY: Found down, unresponsive Decision Support Exception - unselect if not a suspected or confirmed emergency medical condition->Emergency Medical Condition (MA) Reason for Exam: overdose pt vented FINDINGS: BONES/ALIGNMENT: There is no acute fracture or traumatic malalignment. DEGENERATIVE CHANGES: Minimal scattered degenerative changes. SOFT TISSUES: There is no prevertebral soft tissue swelling. No acute fracture traumatic malalignment of the cervical spine. XR CHEST PORTABLE    Result Date: 9/25/2021  EXAMINATION: ONE XRAY VIEW OF THE CHEST 9/25/2021 12:02 am COMPARISON: 09/24/2021 at 2137 hours HISTORY: ORDERING SYSTEM PROVIDED HISTORY: Intubated, resp failure TECHNOLOGIST PROVIDED HISTORY: Intubated, resp failure Reason for Exam: intubated FINDINGS: Endotracheal tube tip is at the level of mid clavicles 4.8 cm above the vignesh. Enteric tube courses below level hemidiaphragms tip within within the right upper quadrant. The cardiomediastinal silhouette is unremarkable in size and contour. Slight prominence of perihilar interstitial markings noted. No pleural effusion or pneumothorax is present. Satisfactory position of the endotracheal tube and enteric tube. Increased perihilar interstitial markings could be related to low lung volumes. This is     XR CHEST PORTABLE    Result Date: 9/24/2021  EXAM: XR CHEST PORTABLE HISTORY: Reason for exam:->tube placement COMPARISON: Two-view chest from 10/6/2014. TECHNIQUE: Portable chest was done at 9:55 PM. FINDINGS: Tip of an ET tube is 4.7 cm above the vignesh. Trachea, mediastinum and heart size are unremarkable. Lungs are clear and well aerated without infiltrate or nodule or effusion. Diaphragm and bony elements are intact. 1. Tip of the ET tube is 4.7 cm above the vignesh.  2. Nonacute portable chest.         EKG    EKG Interpretation    Interpreted by emergency department physician    Rhythm: sinus tachycardia  Rate: normal  Axis: normal  Ectopy: none  Conduction: normal  ST Segments: normal  T Waves: normal  Q Waves: none    Clinical Impression: Sinus tachycardia, otherwise normal EKG    Samantha Little MD      All EKG's are interpreted by the Emergency Department Physicianwho either signs or Co-signs this chart in the absence of a cardiologist.    EMERGENCY DEPARTMENT COURSE:  ED Course as of Sep 25 0246   Sat Sep 25, 2021   0244 Laboratory work-up is unremarkable except for leukocytosis. Urinalysis is significant for multiple substances, making drug overdose the most likely etiology of the patient's respiratory failure. Patient still intoxicated and unable to follow commands. Not yet a candidate for extubation. Will admit to medical ICU. [GG]      ED Course User Index  [GG] Samantha Little MD          PROCEDURES:  None    CONSULTS:  IP CONSULT TO CRITICAL CARE    CRITICAL CARE:  Please see attending note    FINAL IMPRESSION      1. Polysubstance abuse ([employer] [facility] [address]    2. Drug overdose, undetermined intent, initial encounter    3. Acute respiratory failure, unspecified whether with hypoxia or hypercapnia ([address]          DISPOSITION / PLAN     DISPOSITION Admitted 09/25/2021 02:36:39 AM      PATIENT REFERRED TO:  No follow-up provider specified.     DISCHARGE MEDICATIONS:  New Prescriptions    No medications on file       Samantha Little MD  Emergency Medicine Resident    (Please note that portions of this note were completed with a voice recognition program.Efforts were made to edit the dictations but occasionally words are mis-transcribed.)       Samantha Little MD  Resident  09/25/21 5844

## 2021-09-25 NOTE — H&P
Critical Care - History and Physical Examination    Patient's name:  Mary Browne  Medical Record Number: [id number]  Patient's account/billing number: [de-identified]  Patient's YOB: 1980  Age: 42 y.o. Date of Admission: 9/24/2021 11:43 PM  Date of History and Physical Examination: 9/25/2021      Primary Care Physician: Anayeli Esparza MD  Attending Physician: Dr. Linda Mckenna     Code Status: No Order    Chief complaint:   Chief Complaint   Patient presents with    Drug Overdose     Pt via life flight from [facility] for Heroin overdose. Pt received 16mg of narcan with no response. Pt intubated upon arrival.          HISTORY OF PRESENT ILLNESS:      History was obtained from chart review. Mary Browne is a 42 y.o. with PMH of drug use and abuse, anxiety and depression   -    Presented to ER after being found down for unknown amount of time, altered. Patient given 16 mg of narcan at outlying facility and ultimately intubated for airway protection. Upon admission, pt was intubated, sedated, moving all extremities on sedation holiday, normotensive, tachycardic, and febrile. Admission ABG showed pH of 7.35, PCO2 of 46, HCO3 of 27     Labs showed leukocytosis,  normal electrolites, normal kidney function tests, and normal LFT. Xray showed   Increased perihilar interstitial markings could be related to low lung   volumes     Patient admitted to the ICU for further workup and management     PAST MEDICAL HISTORY:         Diagnosis Date    Anxiety     Drug abuse ([address]     Restless leg          PAST SURGICAL HISTORY:     No past surgical history on file. ALLERGIES:      No Known Allergies      HOME MEDS: :      Prior to Admission medications    Medication Sig Start Date End Date Taking?  Authorizing Provider   ibuprofen (IBU) 600 MG tablet Take 1 tablet by mouth every 6 hours as needed for Pain 11/21/18   Antionette Godoy MD   buPROPion [facility] SR) 150 MG extended release tablet Take 1 tablet by mouth 2 times daily 10/23/18   Kanchan Denson MD   gabapentin (NEURONTIN) 800 MG tablet Take 1 tablet by mouth three times daily for 14 days. .  Patient taking differently: Take 800 mg by mouth 4 times daily. . 10/9/18 10/26/20  Case Calzada MD       SOCIAL HISTORY:       TOBACCO:   reports that he has been smoking cigarettes. He has a 5.00 pack-year smoking history. He has quit using smokeless tobacco.  ETOH:   reports no history of alcohol use. DRUGS:  reports no history of drug use. OCCUPATION:  n/a     FAMILY HISTORY:      No family history on file. REVIEW OF SYSTEMS (ROS):     Review of Systems - Unable to obtain as patient is intubated and sedated     OBJECTIVE:     VITAL SIGNS:  /85   Pulse 106   Resp 13   Wt 164 lb 14.5 oz (74.8 kg)   SpO2 99%   BMI 23.00 kg/m²   Tmax over 24 hours:  Temp (24hrs), Av.8 °F (36.6 °C), Min:97.8 °F (36.6 °C), Max:97.8 °F (36.6 °C)      Patient Vitals for the past 8 hrs:   BP Pulse Resp SpO2 Weight   21 0135 134/85 106 13 -- --   21 0116 (!) 142/82 105 14 99 % --   21 0108 (!) 152/107 124 18 98 % --   21 0102 -- 114 -- -- 164 lb 14.5 oz (74.8 kg)   21 0034 137/86 106 16 100 % --   21 0016 128/81 110 16 98 % --   21 0001 (!) 122/55 105 17 99 % --   21 2347 109/83 101 17 99 % --   214 115/77 99 15 99 % --   21 -- 101 14 99 % --       No intake or output data in the 24 hours ending 21 0232    Wt Readings from Last 3 Encounters:   21 164 lb 14.5 oz (74.8 kg)   21 165 lb (74.8 kg)   10/26/20 167 lb (75.8 kg)     Body mass index is 23 kg/m². PHYSICAL EXAM:  Constitutional:  Intubated/sedated, moving extremities on sedation holiday   EENT: neck supple with midline trachea.   Neck: Supple, symmetrical, trachea midline, no adenopathy,  no jvd, skin normal  Respiratory: clear to auscultation,  Rales and diminished breath sounds in bilateral lowers, and unlabored breathing. No intercostal tenderness  Cardiovascular: tachycardic, sinus tachycardia   Abdomen: soft, nontender, nondistended, no masses or organomegaly  Extremities:   no pedal edema, no clubbing or cyanosis    MEDICATIONS:  Scheduled Meds:    Continuous Infusions:    PRN Meds:         ABGs:   Lab Results   Component Value Date    FQC7PPU NOT REPORTED 09/25/2021    FIO2 30.0 09/25/2021       DATA:  Complete Blood Count:   Recent Labs     09/25/21 0119   WBC 19.5*   RBC 5.44   HGB 15.5   HCT 47.5   MCV 87.3   MCH 28.5   MCHC 32.6   RDW 13.0      MPV 9.0        Last 3 Blood Glucose:   Recent Labs     09/25/21 0119   GLUCOSE 90        PT/INR:  No results found for: PROTIME, INR  PTT:  No results found for: APTT, PTT    Comprehensive Metabolic Profile:   Recent Labs     09/25/21 0119      K 4.1      CO2 21   BUN 13   CREATININE 1.00   GLUCOSE 90   CALCIUM 9.1   PROT 7.8   LABALBU 4.5   BILITOT 0.88   ALKPHOS 112   AST 35   ALT 18      Magnesium:   Lab Results   Component Value Date    MG 2.2 11/12/2019     Phosphorus: No results found for: PHOS  Ionized Calcium: No results found for: CAION     Urinalysis:   Lab Results   Component Value Date    NITRU NEGATIVE 09/25/2021    COLORU Dark Yellow 09/25/2021    PHUR 6.0 09/25/2021    WBCUA 2 TO 5 09/25/2021    RBCUA 5 TO 10 09/25/2021    MUCUS NOT REPORTED 09/25/2021    TRICHOMONAS NOT REPORTED 09/25/2021    YEAST NOT REPORTED 09/25/2021    BACTERIA NOT REPORTED 09/25/2021    SPECGRAV 1.029 09/25/2021    LEUKOCYTESUR NEGATIVE 09/25/2021    UROBILINOGEN Normal 09/25/2021    BILIRUBINUR NEGATIVE 09/25/2021    GLUCOSEU NEGATIVE 09/25/2021    KETUA TRACE 09/25/2021    AMORPHOUS NOT REPORTED 09/25/2021       HgBA1c:  No results found for: LABA1C  TSH:  No results found for: TSH    Lactic Acid: No results found for: LACTA   Troponin: No results for input(s): TROPONINI in the last 72 hours.     Electrocardiogram:   Sinus tachycardia     Last Echocardiogram findings:   (See actual reports for details)  None on file     Radiological imaging  CT Head WO Contrast    Result Date: 9/25/2021  EXAMINATION: CT OF THE HEAD WITHOUT CONTRAST  9/25/2021 1:26 am TECHNIQUE: CT of the head was performed without the administration of intravenous contrast. Dose modulation, iterative reconstruction, and/or weight based adjustment of the mA/kV was utilized to reduce the radiation dose to as low as reasonably achievable. COMPARISON: None. HISTORY: ORDERING SYSTEM PROVIDED HISTORY: Found down, unresponsive TECHNOLOGIST PROVIDED HISTORY: Found down, unresponsive Decision Support Exception - unselect if not a suspected or confirmed emergency medical condition->Emergency Medical Condition (MA) Reason for Exam: overdose pt vented FINDINGS: Suboptimal head positioning challenge evaluation and degrades image quality. BRAIN/VENTRICLES: There is no acute intracranial hemorrhage, mass effect or midline shift. No abnormal extra-axial fluid collection. The gray-white differentiation is maintained without evidence of an acute infarct. There is no evidence of hydrocephalus. ORBITS: The visualized portion of the orbits demonstrate no acute abnormality. SINUSES: Moderate paranasal sinus disease identified with air-fluid level identified involving the right maxillary sinus. This could be related to intubation status. SOFT TISSUES/SKULL:  No acute abnormality of the visualized skull or soft tissues. No acute bleed or midline shift. No definite loss of gray differentiation. If there is persistent concern for anoxic hypoxic injury, consider short-term repeat head CT such is in 6-8 hours     CT CERVICAL SPINE WO CONTRAST    Result Date: 9/25/2021  EXAMINATION: CT OF THE CERVICAL SPINE WITHOUT CONTRAST 9/25/2021 1:26 am TECHNIQUE: CT of the cervical spine was performed without the administration of intravenous contrast. Multiplanar reformatted images are provided for review.  Dose modulation, iterative reconstruction, and/or weight based adjustment of the mA/kV was utilized to reduce the radiation dose to as low as reasonably achievable. COMPARISON: None. HISTORY: ORDERING SYSTEM PROVIDED HISTORY: Found down, unresponsive TECHNOLOGIST PROVIDED HISTORY: Found down, unresponsive Decision Support Exception - unselect if not a suspected or confirmed emergency medical condition->Emergency Medical Condition (MA) Reason for Exam: overdose pt vented FINDINGS: BONES/ALIGNMENT: There is no acute fracture or traumatic malalignment. DEGENERATIVE CHANGES: Minimal scattered degenerative changes. SOFT TISSUES: There is no prevertebral soft tissue swelling. No acute fracture traumatic malalignment of the cervical spine. XR CHEST PORTABLE    Result Date: 9/25/2021  EXAMINATION: ONE XRAY VIEW OF THE CHEST 9/25/2021 12:02 am COMPARISON: 09/24/2021 at 2137 hours HISTORY: ORDERING SYSTEM PROVIDED HISTORY: Intubated, resp failure TECHNOLOGIST PROVIDED HISTORY: Intubated, resp failure Reason for Exam: intubated FINDINGS: Endotracheal tube tip is at the level of mid clavicles 4.8 cm above the vignesh. Enteric tube courses below level hemidiaphragms tip within within the right upper quadrant. The cardiomediastinal silhouette is unremarkable in size and contour. Slight prominence of perihilar interstitial markings noted. No pleural effusion or pneumothorax is present. Satisfactory position of the endotracheal tube and enteric tube. Increased perihilar interstitial markings could be related to low lung volumes. This is     XR CHEST PORTABLE    Result Date: 9/24/2021  EXAM: XR CHEST PORTABLE HISTORY: Reason for exam:->tube placement COMPARISON: Two-view chest from 10/6/2014. TECHNIQUE: Portable chest was done at 9:55 PM. FINDINGS: Tip of an ET tube is 4.7 cm above the vignesh. Trachea, mediastinum and heart size are unremarkable. Lungs are clear and well aerated without infiltrate or nodule or effusion. Diaphragm and bony elements are intact. 1. Tip of the ET tube is 4.7 cm above the vignesh. 2. Nonacute portable chest.       ASSESSMENT:     Active Problems:    * No active hospital problems. *  Resolved Problems:    * No resolved hospital problems. *      PLAN:     ICU PROPHYLAXIS:  Stress ulcer:  [] PPI Agent  [x] [id number] [] Sucralfate  [] Other:  VTE:   [x] Enoxaparin  [] Unfract. Heparin Subcut  [] EPC Cuffs    NUTRITION:  [x] NPO  [] Tube Feeding (Specify: ) [] TPN  [] PO    CONSULTATION NEEDED:  [x] No   [] Yes     HOME MEDICATIONS RECONCILED: [] No  [x] Yes    FAMILY UPDATED:    [x] No   [] Yes     ADDITIONAL PLAN:    - Acute respiratory failure secondary to drug overdose     Neuro:  [employer] all extremities on sedation holiday    Sedation: Propofol    Pain control: Tylenol     CV:   Telemetry   BP normal   Sinus tachycardia - continue to monitor after fluid resuscitation    Repeat EKG pending     Heme:   H&H stable     Resp:   Mechanical ventilation: PRVC/30/16/530/5    CXR perihilar infiltrate    Rocephin and Azithromycin     /Fluids/Electrolytes:   BUN 13 , Cr 0.86   Monitor UO   Daily BMP   Electrolyte replacement PRN    Fluids 1 liter bolus, D5 1/2 normal @ 125 ml/hr    Urine drug screen positive for: amphetamines, benzodiazepines, cannabis,  methadone, methamphetamine, opiods     GI/Nutrition:   NPO    Bowel regimen:glycolax prn     ID:   WBC 19   Rocephin IV    Azithromycin IV     Endo:   Monitor glucose    Musculoskeletal:   Injuries: right ankle xr negative    Restrictions: none     Lines/Drains:   PIV    ET tube    Driscoll     Prophylaxis:   DVT: lovenox, SCD   GI: Pepcid     Outside Images:   CT head, CT cervical spine negative         Parviz Finnegan MD  ICU resident   Debbycinthya  9/25/2021 2:32 AM    The critical care team assigned to the patient will be following up the patient in the intensive care unit.  I have discussed the current plan with the critical care attending. The above mentioned assessment and plan will be reviewed again in detail by the critical care attending at bedside, and can be further changed or modified accordingly by the attending physician.

## 2021-09-25 NOTE — ED PROVIDER NOTES
[facility] ED  Faculty Attestation    I performed a history and physical examination of the patient and discussed management with the resident. I reviewed the residents note and agree with the documented findings and plan of care. Any areas of disagreement are noted on the chart. I was personally present for the key portions of any procedures. I have documented in the chart those procedures where I was not present during the key portions. I have reviewed the emergency nurses triage note. I agree with the chief complaint, past medical history, past surgical history, allergies, medications, social, and family history as documented unless otherwise noted below. This is a 54-year-old male transferred to this hospital from [facility]  Patient reportedly found down with an altered level of conscious   He was reportedly unresponsive to multiple high doses of Narcan  Patient was intubated for airway protection and transferred emergently here  Received minimal labs or work-up at [facility] prior to transfer  No additional history is available at this time  Patient arrives on propofol for sedation    On examination patient is sedated  Patient does withdrawal to painful stimulus but does not open his eyes or follow commands  Mild disconjugate gaze  Pupils are 4 mm and briskly reactive bilaterally  Head and face showed no apparent trauma  No JVD  Trachea is midline  Oral mucosa is moist  Intubated with OG tube  Heart is regular in rate and rhythm in the high 90s  No murmur  Breath sounds present bilaterally  Abdomen soft with soft bowel sounds  No pretibial pitting edema  Normal peripheral perfusion and skin turgor    EKG at 1:37 AM shows a sinus tachycardia with a rate of 109 bpm.  Intervals within normal limits. Normal axis. R wave progression is maintained. No T wave inversion. No ST segment depression/elevation evidence for acute ischemia/infarction. No arrhythmia.     Labs and imaging reviewed  Patient will be admitted to the ICU      Kita Lim DO  Attending Emergency Physician        Louise Gonzalez DO  09/25/21 [id number]

## 2021-09-25 NOTE — PLAN OF CARE
Problem: Non-Violent Restraints  Goal: No harm/injury to patient while restraints in use  Outcome: Met This Shift  Goal: Patient's dignity will be maintained  Outcome: Met This Shift     Problem: Non-Violent Restraints  Goal: Removal from restraints as soon as assessed to be safe  Outcome: Ongoing     Problem: Confusion - Acute:  Goal: Absence of continued neurological deterioration signs and symptoms  Description: Absence of continued neurological deterioration signs and symptoms  Outcome: Ongoing  Goal: Mental status will be restored to baseline  Description: Mental status will be restored to baseline  Outcome: Ongoing     Problem: Discharge Planning:  Goal: Ability to perform activities of daily living will improve  Description: Ability to perform activities of daily living will improve  Outcome: Ongoing  Goal: Participates in care planning  Description: Participates in care planning  Outcome: Ongoing     Problem: Injury - Risk of, Physical Injury:  Goal: Absence of physical injury  Description: Absence of physical injury  Outcome: Ongoing  Goal: Will remain free from falls  Description: Will remain free from falls  Outcome: Ongoing     Problem: Mood - Altered:  Goal: Mood stable  Description: Mood stable  Outcome: Ongoing  Goal: Absence of abusive behavior  Description: Absence of abusive behavior  Outcome: Ongoing  Goal: Verbalizations of feeling emotionally comfortable while being cared for will increase  Description: Verbalizations of feeling emotionally comfortable while being cared for will increase  Outcome: Ongoing     Problem: Psychomotor Activity - Altered:  Goal: Absence of psychomotor disturbance signs and symptoms  Description: Absence of psychomotor disturbance signs and symptoms  Outcome: Ongoing     Problem: Sensory Perception - Impaired:  Goal: Demonstrations of improved sensory functioning will increase  Description: Demonstrations of improved sensory functioning will increase  Outcome: Ongoing  Goal: Decrease in sensory misperception frequency  Description: Decrease in sensory misperception frequency  Outcome: Ongoing  Goal: Able to refrain from responding to false sensory perceptions  Description: Able to refrain from responding to false sensory perceptions  Outcome: Ongoing  Goal: Demonstrates accurate environmental perceptions  Description: Demonstrates accurate environmental perceptions  Outcome: Ongoing  Goal: Able to distinguish between reality-based and nonreality-based thinking  Description: Able to distinguish between reality-based and nonreality-based thinking  Outcome: Ongoing  Goal: Able to interrupt nonreality-based thinking  Description: Able to interrupt nonreality-based thinking  Outcome: Ongoing     Problem: Sleep Pattern Disturbance:  Goal: Appears well-rested  Description: Appears well-rested  Outcome: Ongoing

## 2021-09-25 NOTE — PLAN OF CARE
Problem: Non-Violent Restraints  Goal: Removal from restraints as soon as assessed to be safe  9/25/2021 1123 by Chelo Bearden RN  Outcome: Ongoing  9/25/2021 0442 by Kevin Reese RN  Outcome: Ongoing  Goal: No harm/injury to patient while restraints in use  9/25/2021 1123 by Chelo Bearden RN  Outcome: Ongoing  9/25/2021 0442 by Kevin Reese RN  Outcome: Met This Shift  Goal: Patient's dignity will be maintained  9/25/2021 1123 by Chelo Bearden RN  Outcome: Ongoing  9/25/2021 0442 by Kevin Reese RN  Outcome: Met This Shift     Problem: Confusion - Acute:  Goal: Absence of continued neurological deterioration signs and symptoms  Description: Absence of continued neurological deterioration signs and symptoms  9/25/2021 1123 by Chelo Bearden RN  Outcome: Ongoing  9/25/2021 0442 by Kevin Reese RN  Outcome: Ongoing  Goal: Mental status will be restored to baseline  Description: Mental status will be restored to baseline  9/25/2021 1123 by Chelo Bearden RN  Outcome: Ongoing  9/25/2021 0442 by Kevin Reese RN  Outcome: Ongoing     Problem: Discharge Planning:  Goal: Ability to perform activities of daily living will improve  Description: Ability to perform activities of daily living will improve  9/25/2021 1123 by Chelo Bearden RN  Outcome: Ongoing  9/25/2021 0442 by Kevin Reese RN  Outcome: Ongoing  Goal: Participates in care planning  Description: Participates in care planning  9/25/2021 1123 by Chelo Bearden RN  Outcome: Ongoing  9/25/2021 0442 by Kevin Reese RN  Outcome: Ongoing     Problem: Injury - Risk of, Physical Injury:  Goal: Absence of physical injury  Description: Absence of physical injury  9/25/2021 1123 by Chelo Bearden RN  Outcome: Ongoing  9/25/2021 0442 by Kevin Reese RN  Outcome: Ongoing  Goal: Will remain free from falls  Description: Will remain free from falls  9/25/2021 1123 by Chelo Bearden RN  Outcome: Ongoing  9/25/2021 0442 by Kevin Reese RN  Outcome: Ongoing Problem: Mood - Altered:  Goal: Mood stable  Description: Mood stable  9/25/2021 1123 by Elinor Wagner RN  Outcome: Ongoing  9/25/2021 0442 by Jane Pike RN  Outcome: Ongoing  Goal: Absence of abusive behavior  Description: Absence of abusive behavior  9/25/2021 1123 by Elinor Wagner RN  Outcome: Ongoing  9/25/2021 0442 by Jane Pike RN  Outcome: Ongoing  Goal: Verbalizations of feeling emotionally comfortable while being cared for will increase  Description: Verbalizations of feeling emotionally comfortable while being cared for will increase  9/25/2021 1123 by Elinor Wagner RN  Outcome: Ongoing  9/25/2021 0442 by Jane Pike RN  Outcome: Ongoing     Problem: Psychomotor Activity - Altered:  Goal: Absence of psychomotor disturbance signs and symptoms  Description: Absence of psychomotor disturbance signs and symptoms  9/25/2021 1123 by Elinor Wagner RN  Outcome: Ongoing  9/25/2021 0442 by Jane Pike RN  Outcome: Ongoing     Problem: Sensory Perception - Impaired:  Goal: Demonstrations of improved sensory functioning will increase  Description: Demonstrations of improved sensory functioning will increase  9/25/2021 1123 by Elinor Wagner RN  Outcome: Ongoing  9/25/2021 0442 by Jane Pike RN  Outcome: Ongoing  Goal: Decrease in sensory misperception frequency  Description: Decrease in sensory misperception frequency  9/25/2021 1123 by Elinor Wagner RN  Outcome: Ongoing  9/25/2021 0442 by Jane Pike RN  Outcome: Ongoing  Goal: Able to refrain from responding to false sensory perceptions  Description: Able to refrain from responding to false sensory perceptions  9/25/2021 1123 by Elinor Wagner RN  Outcome: Ongoing  9/25/2021 0442 by Jane Pike RN  Outcome: Ongoing  Goal: Demonstrates accurate environmental perceptions  Description: Demonstrates accurate environmental perceptions  9/25/2021 1123 by Elinor Wagner RN  Outcome: Ongoing  9/25/2021 0442 by Jane Pike RN  Outcome: Ongoing  Goal: Able to distinguish between reality-based and nonreality-based thinking  Description: Able to distinguish between reality-based and nonreality-based thinking  9/25/2021 1123 by Colette Cannon RN  Outcome: Ongoing  9/25/2021 0442 by Isidoro Cockayne, RN  Outcome: Ongoing  Goal: Able to interrupt nonreality-based thinking  Description: Able to interrupt nonreality-based thinking  9/25/2021 1123 by Colette Cannon RN  Outcome: Ongoing  9/25/2021 0442 by Isidoro Cockayne, RN  Outcome: Ongoing     Problem: Sleep Pattern Disturbance:  Goal: Appears well-rested  Description: Appears well-rested  9/25/2021 1123 by Colette Cannon RN  Outcome: Ongoing  9/25/2021 0442 by Isidoro Cockayne, RN  Outcome: Ongoing     Problem: OXYGENATION/RESPIRATORY FUNCTION  Goal: Patient will maintain patent airway  9/25/2021 1123 by Colette Cannon RN  Outcome: Ongoing  9/25/2021 0500 by Kacey Curran RCP  Outcome: Ongoing  Goal: Patient will achieve/maintain normal respiratory rate/effort  Description: Respiratory rate and effort will be within normal limits for the patient  9/25/2021 1123 by Colette Cannon RN  Outcome: Ongoing  9/25/2021 0500 by Kacey Curran RCP  Outcome: Ongoing     Problem: MECHANICAL VENTILATION  Goal: Patient will maintain patent airway  9/25/2021 1123 by Colette Cannon RN  Outcome: Ongoing  9/25/2021 0500 by Kacey Curran RCP  Outcome: Ongoing  Goal: Oral health is maintained or improved  9/25/2021 1123 by Colette Cannon RN  Outcome: Ongoing  9/25/2021 0500 by Kacey Curran RCP  Outcome: Ongoing  Goal: ET tube will be managed safely  9/25/2021 1123 by Colette Cannon RN  Outcome: Ongoing  9/25/2021 0500 by Kacey Curran RCP  Outcome: Ongoing  Goal: Ability to express needs and understand communication  9/25/2021 1123 by Colette Cannon RN  Outcome: Ongoing  9/25/2021 0500 by Kacey Curran RCP  Outcome: Ongoing  Goal: Mobility/activity is maintained at optimum level for patient  9/25/2021 1123 by Colette Cannon RN  Outcome: Ongoing  9/25/2021 0500 by Guillermina Torres RCP  Outcome: Ongoing

## 2021-09-25 NOTE — ED NOTES
Bed: 15  Expected date:   Expected time:   Means of arrival:   Comments:  LF   42year old male    Heroin overdose    Combative    Intubated    No trauma Accepted By Dr Guillaume Neumann, RN  09/24/21 2790

## 2021-09-26 LAB
ABSOLUTE EOS #: 0.13 K/UL (ref 0–0.44)
ABSOLUTE IMMATURE GRANULOCYTE: 0.05 K/UL (ref 0–0.3)
ABSOLUTE LYMPH #: 2.09 K/UL (ref 1.1–3.7)
ABSOLUTE MONO #: 0.71 K/UL (ref 0.1–1.2)
ALBUMIN SERPL-MCNC: 3.7 G/DL (ref 3.5–5.2)
ALBUMIN/GLOBULIN RATIO: 1.2 (ref 1–2.5)
ALLEN TEST: POSITIVE
ALP BLD-CCNC: 94 U/L (ref 40–129)
ALT SERPL-CCNC: 16 U/L (ref 5–41)
ANION GAP SERPL CALCULATED.3IONS-SCNC: 12 MMOL/L (ref 9–17)
AST SERPL-CCNC: 39 U/L
BASOPHILS # BLD: 0 % (ref 0–2)
BASOPHILS ABSOLUTE: 0.04 K/UL (ref 0–0.2)
BILIRUB SERPL-MCNC: 0.46 MG/DL (ref 0.3–1.2)
BUN BLDV-MCNC: 7 MG/DL (ref 6–20)
BUN/CREAT BLD: ABNORMAL (ref 9–20)
CALCIUM SERPL-MCNC: 9 MG/DL (ref 8.6–10.4)
CHLORIDE BLD-SCNC: 100 MMOL/L (ref 98–107)
CO2: 20 MMOL/L (ref 20–31)
CREAT SERPL-MCNC: 0.81 MG/DL (ref 0.7–1.2)
DIFFERENTIAL TYPE: ABNORMAL
EOSINOPHILS RELATIVE PERCENT: 1 % (ref 1–4)
FIO2: 30
GFR AFRICAN AMERICAN: >60 ML/MIN
GFR NON-AFRICAN AMERICAN: >60 ML/MIN
GFR SERPL CREATININE-BSD FRML MDRD: ABNORMAL ML/MIN/{1.73_M2}
GFR SERPL CREATININE-BSD FRML MDRD: ABNORMAL ML/MIN/{1.73_M2}
GLUCOSE BLD-MCNC: 113 MG/DL (ref 70–99)
GLUCOSE BLD-MCNC: 115 MG/DL (ref 75–110)
GLUCOSE BLD-MCNC: 127 MG/DL (ref 74–100)
GLUCOSE BLD-MCNC: 89 MG/DL (ref 75–110)
GLUCOSE BLD-MCNC: 98 MG/DL (ref 75–110)
HCT VFR BLD CALC: 42.3 % (ref 40.7–50.3)
HEMOGLOBIN: 13.8 G/DL (ref 13–17)
IMMATURE GRANULOCYTES: 1 %
LYMPHOCYTES # BLD: 23 % (ref 24–43)
MAGNESIUM: 2.1 MG/DL (ref 1.6–2.6)
MAGNESIUM: 2.2 MG/DL (ref 1.6–2.6)
MCH RBC QN AUTO: 27.8 PG (ref 25.2–33.5)
MCHC RBC AUTO-ENTMCNC: 32.6 G/DL (ref 28.4–34.8)
MCV RBC AUTO: 85.1 FL (ref 82.6–102.9)
MODE: NORMAL
MONOCYTES # BLD: 8 % (ref 3–12)
NEGATIVE BASE EXCESS, ART: NORMAL (ref 0–2)
NRBC AUTOMATED: 0 PER 100 WBC
O2 DEVICE/FLOW/%: NORMAL
PATIENT TEMP: NORMAL
PDW BLD-RTO: 13.2 % (ref 11.8–14.4)
PLATELET # BLD: 157 K/UL (ref 138–453)
PLATELET ESTIMATE: ABNORMAL
PMV BLD AUTO: 9.5 FL (ref 8.1–13.5)
POC HCO3: 25.9 MMOL/L (ref 21–28)
POC O2 SATURATION: 97 % (ref 94–98)
POC PCO2 TEMP: NORMAL MM HG
POC PCO2: 40 MM HG (ref 35–48)
POC PH TEMP: NORMAL
POC PH: 7.42 (ref 7.35–7.45)
POC PO2 TEMP: NORMAL MM HG
POC PO2: 89.3 MM HG (ref 83–108)
POSITIVE BASE EXCESS, ART: 1 (ref 0–3)
POTASSIUM SERPL-SCNC: 3.4 MMOL/L (ref 3.7–5.3)
RBC # BLD: 4.97 M/UL (ref 4.21–5.77)
RBC # BLD: ABNORMAL 10*6/UL
SAMPLE SITE: NORMAL
SEG NEUTROPHILS: 67 % (ref 36–65)
SEGMENTED NEUTROPHILS ABSOLUTE COUNT: 6.09 K/UL (ref 1.5–8.1)
SODIUM BLD-SCNC: 132 MMOL/L (ref 135–144)
TCO2 (CALC), ART: NORMAL MMOL/L (ref 22–29)
TOTAL PROTEIN: 6.8 G/DL (ref 6.4–8.3)
WBC # BLD: 9.1 K/UL (ref 3.5–11.3)
WBC # BLD: ABNORMAL 10*3/UL

## 2021-09-26 PROCEDURE — 82947 ASSAY GLUCOSE BLOOD QUANT: CPT

## 2021-09-26 PROCEDURE — 99291 CRITICAL CARE FIRST HOUR: CPT | Performed by: INTERNAL MEDICINE

## 2021-09-26 PROCEDURE — 2500000003 HC RX 250 WO HCPCS: Performed by: STUDENT IN AN ORGANIZED HEALTH CARE EDUCATION/TRAINING PROGRAM

## 2021-09-26 PROCEDURE — 80053 COMPREHEN METABOLIC PANEL: CPT

## 2021-09-26 PROCEDURE — 83735 ASSAY OF MAGNESIUM: CPT

## 2021-09-26 PROCEDURE — 6360000002 HC RX W HCPCS: Performed by: STUDENT IN AN ORGANIZED HEALTH CARE EDUCATION/TRAINING PROGRAM

## 2021-09-26 PROCEDURE — 94761 N-INVAS EAR/PLS OXIMETRY MLT: CPT

## 2021-09-26 PROCEDURE — 82803 BLOOD GASES ANY COMBINATION: CPT

## 2021-09-26 PROCEDURE — 36600 WITHDRAWAL OF ARTERIAL BLOOD: CPT

## 2021-09-26 PROCEDURE — 2580000003 HC RX 258: Performed by: STUDENT IN AN ORGANIZED HEALTH CARE EDUCATION/TRAINING PROGRAM

## 2021-09-26 PROCEDURE — 6370000000 HC RX 637 (ALT 250 FOR IP): Performed by: STUDENT IN AN ORGANIZED HEALTH CARE EDUCATION/TRAINING PROGRAM

## 2021-09-26 PROCEDURE — 94003 VENT MGMT INPAT SUBQ DAY: CPT

## 2021-09-26 PROCEDURE — 76937 US GUIDE VASCULAR ACCESS: CPT

## 2021-09-26 PROCEDURE — 2000000000 HC ICU R&B

## 2021-09-26 PROCEDURE — 85025 COMPLETE CBC W/AUTO DIFF WBC: CPT

## 2021-09-26 PROCEDURE — 2700000000 HC OXYGEN THERAPY PER DAY

## 2021-09-26 PROCEDURE — 36415 COLL VENOUS BLD VENIPUNCTURE: CPT

## 2021-09-26 PROCEDURE — 6360000002 HC RX W HCPCS

## 2021-09-26 RX ORDER — POTASSIUM CHLORIDE 7.45 MG/ML
10 INJECTION INTRAVENOUS ONCE
Status: DISCONTINUED | OUTPATIENT
Start: 2021-09-26 | End: 2021-09-26

## 2021-09-26 RX ORDER — LORAZEPAM 2 MG/ML
4 INJECTION INTRAMUSCULAR EVERY 4 HOURS PRN
Status: DISCONTINUED | OUTPATIENT
Start: 2021-09-26 | End: 2021-09-27

## 2021-09-26 RX ORDER — POTASSIUM CHLORIDE 7.45 MG/ML
40 INJECTION INTRAVENOUS ONCE
Status: COMPLETED | OUTPATIENT
Start: 2021-09-26 | End: 2021-09-26

## 2021-09-26 RX ADMIN — DEXMEDETOMIDINE HYDROCHLORIDE 0.2 MCG/KG/HR: 100 INJECTION, SOLUTION INTRAVENOUS at 12:06

## 2021-09-26 RX ADMIN — SODIUM CHLORIDE, PRESERVATIVE FREE 10 ML: 5 INJECTION INTRAVENOUS at 20:43

## 2021-09-26 RX ADMIN — ENOXAPARIN SODIUM 40 MG: 40 INJECTION SUBCUTANEOUS at 08:15

## 2021-09-26 RX ADMIN — PROPOFOL 30 MCG/KG/MIN: 10 INJECTION, EMULSION INTRAVENOUS at 16:25

## 2021-09-26 RX ADMIN — PROPOFOL 50 MCG/KG/MIN: 10 INJECTION, EMULSION INTRAVENOUS at 08:15

## 2021-09-26 RX ADMIN — LORAZEPAM 4 MG: 2 INJECTION INTRAMUSCULAR; INTRAVENOUS at 05:57

## 2021-09-26 RX ADMIN — PROPOFOL 50 MCG/KG/MIN: 10 INJECTION, EMULSION INTRAVENOUS at 05:14

## 2021-09-26 RX ADMIN — FAMOTIDINE 20 MG: 20 TABLET, FILM COATED ORAL at 08:15

## 2021-09-26 RX ADMIN — LORAZEPAM 4 MG: 2 INJECTION INTRAMUSCULAR; INTRAVENOUS at 19:43

## 2021-09-26 RX ADMIN — POTASSIUM CHLORIDE 40 MEQ: 7.46 INJECTION, SOLUTION INTRAVENOUS at 20:43

## 2021-09-26 RX ADMIN — DEXMEDETOMIDINE HYDROCHLORIDE 0.8 MCG/KG/HR: 100 INJECTION, SOLUTION INTRAVENOUS at 20:00

## 2021-09-26 RX ADMIN — LORAZEPAM 4 MG: 2 INJECTION INTRAMUSCULAR; INTRAVENOUS at 10:42

## 2021-09-26 RX ADMIN — CEFTRIAXONE SODIUM 1000 MG: 1 INJECTION, POWDER, FOR SOLUTION INTRAMUSCULAR; INTRAVENOUS at 08:16

## 2021-09-26 RX ADMIN — PROPOFOL 50 MCG/KG/MIN: 10 INJECTION, EMULSION INTRAVENOUS at 12:32

## 2021-09-26 RX ADMIN — LORAZEPAM 4 MG: 2 INJECTION INTRAMUSCULAR; INTRAVENOUS at 01:26

## 2021-09-26 RX ADMIN — SODIUM CHLORIDE, PRESERVATIVE FREE 10 ML: 5 INJECTION INTRAVENOUS at 08:15

## 2021-09-26 RX ADMIN — PROPOFOL 50 MCG/KG/MIN: 10 INJECTION, EMULSION INTRAVENOUS at 00:58

## 2021-09-26 RX ADMIN — DEXTROSE AND SODIUM CHLORIDE: 5; 450 INJECTION, SOLUTION INTRAVENOUS at 08:10

## 2021-09-26 ASSESSMENT — PULMONARY FUNCTION TESTS
PIF_VALUE: 22
PIF_VALUE: 19
PIF_VALUE: 11
PIF_VALUE: 21
PIF_VALUE: 20
PIF_VALUE: 11
PIF_VALUE: 20
PIF_VALUE: 20
PIF_VALUE: 19
PIF_VALUE: 20
PIF_VALUE: 18

## 2021-09-26 NOTE — PLAN OF CARE
Problem: OXYGENATION/RESPIRATORY FUNCTION  Goal: Patient will maintain patent airway  9/26/2021 0811 by Jorge Oakes RCP  Outcome: Ongoing  9/26/2021 0741 by Sterling Driscoll RN  Outcome: Ongoing  9/26/2021 0722 by Ivette Rodrigues RN  Outcome: Ongoing  9/26/2021 0546 by Tomás Hanna RCP  Outcome: Ongoing  Goal: Patient will achieve/maintain normal respiratory rate/effort  Description: Respiratory rate and effort will be within normal limits for the patient  9/26/2021 0811 by YAHAIRA SantoyoP  Outcome: Ongoing  9/26/2021 0741 by Sterling Driscoll RN  Outcome: Ongoing  9/26/2021 0722 by Ivette Rodrigues RN  Outcome: Ongoing  9/26/2021 0546 by Tomás Hanna RCP  Outcome: Ongoing     Problem: MECHANICAL VENTILATION  Goal: Patient will maintain patent airway  9/26/2021 0811 by YAHAIRA SantoyoP  Outcome: Ongoing  9/26/2021 0741 by Sterling Driscoll RN  Outcome: Ongoing  9/26/2021 0722 by Ivette Rodrigues RN  Outcome: Ongoing  9/26/2021 0546 by Tomás Hanna RCP  Outcome: Ongoing  Goal: Oral health is maintained or improved  9/26/2021 0811 by Jorge Oakes RCP  Outcome: Ongoing  9/26/2021 0741 by Sterling Driscoll RN  Outcome: Ongoing  9/26/2021 0722 by Ivette Rodrigues RN  Outcome: Ongoing  9/26/2021 0546 by Tomás Hanna RCP  Outcome: Ongoing  Goal: ET tube will be managed safely  9/26/2021 0811 by Jorge Oakes RCP  Outcome: Ongoing  9/26/2021 0741 by Sterling Driscoll RN  Outcome: Ongoing  9/26/2021 0722 by Ivette Rodrigues RN  Outcome: Ongoing  9/26/2021 0546 by Tomás Hanna RCP  Outcome: Ongoing  Goal: Ability to express needs and understand communication  9/26/2021 0811 by Jorge Oakes RCP  Outcome: Ongoing  9/26/2021 0741 by Sterling Driscoll RN  Outcome: Ongoing  9/26/2021 0722 by Ivette Ravi, RN  Outcome: Ongoing  9/26/2021 0546 by Tomás Hanna RCP  Outcome: Ongoing  Goal: Mobility/activity is maintained at optimum level for patient  9/26/2021 0811 by Jorge Oakes RCP  Outcome: Ongoing  9/26/2021

## 2021-09-26 NOTE — PLAN OF CARE
Problem: Non-Violent Restraints  Goal: Removal from restraints as soon as assessed to be safe  9/26/2021 0741 by Aiden Edmond RN  Outcome: Ongoing  9/26/2021 0722 by Melonie Vela RN  Outcome: Not Met This Shift  Goal: No harm/injury to patient while restraints in use  9/26/2021 0741 by Aiden Edmond RN  Outcome: Ongoing  9/26/2021 0722 by Melonie Vela RN  Outcome: Met This Shift  Goal: Patient's dignity will be maintained  9/26/2021 0741 by Aiden Edmond RN  Outcome: Ongoing  9/26/2021 0722 by Melonie Vela RN  Outcome: Met This Shift     Problem: Confusion - Acute:  Goal: Absence of continued neurological deterioration signs and symptoms  Description: Absence of continued neurological deterioration signs and symptoms  9/26/2021 0741 by Aiden Edmond RN  Outcome: Ongoing  9/26/2021 0722 by Melonie Vela RN  Outcome: Not Met This Shift  Goal: Mental status will be restored to baseline  Description: Mental status will be restored to baseline  9/26/2021 0741 by Aiden Edmond RN  Outcome: Ongoing  9/26/2021 0722 by Melonie Vlea RN  Outcome: Not Met This Shift     Problem: Discharge Planning:  Goal: Ability to perform activities of daily living will improve  Description: Ability to perform activities of daily living will improve  9/26/2021 0741 by Aiden Edmond RN  Outcome: Ongoing  9/26/2021 0722 by Melonie Vela RN  Outcome: Ongoing  Goal: Participates in care planning  Description: Participates in care planning  9/26/2021 0741 by Aiden Edmond RN  Outcome: Ongoing  9/26/2021 0722 by Melonie Vela RN  Outcome: Ongoing     Problem: Injury - Risk of, Physical Injury:  Goal: Absence of physical injury  Description: Absence of physical injury  9/26/2021 0741 by Aiden Edmond RN  Outcome: Ongoing  9/26/2021 0722 by Melonie Vela RN  Outcome: Met This Shift  Goal: Will remain free from falls  Description: Will remain free from falls  9/26/2021 0741 by Aiden Edmond RN  Outcome: Ongoing  9/26/2021 0722 by Melonie Vela RN  Outcome: Met This Shift Problem: Mood - Altered:  Goal: Mood stable  Description: Mood stable  9/26/2021 0741 by Laurel Pace RN  Outcome: Ongoing  9/26/2021 0722 by Dawna Seals RN  Outcome: Ongoing  Goal: Absence of abusive behavior  Description: Absence of abusive behavior  9/26/2021 0741 by Laurel Pace RN  Outcome: Ongoing  9/26/2021 0722 by Dawna Seals RN  Outcome: Ongoing  Goal: Verbalizations of feeling emotionally comfortable while being cared for will increase  Description: Verbalizations of feeling emotionally comfortable while being cared for will increase  9/26/2021 0741 by Laurel Pace RN  Outcome: Ongoing  9/26/2021 0722 by Dawna Seals RN  Outcome: Ongoing     Problem: Psychomotor Activity - Altered:  Goal: Absence of psychomotor disturbance signs and symptoms  Description: Absence of psychomotor disturbance signs and symptoms  9/26/2021 0741 by Laurel Pace RN  Outcome: Ongoing  9/26/2021 0722 by Dawna Seals RN  Outcome: Ongoing     Problem: Sensory Perception - Impaired:  Goal: Demonstrations of improved sensory functioning will increase  Description: Demonstrations of improved sensory functioning will increase  9/26/2021 0741 by Laurel Pace RN  Outcome: Ongoing  9/26/2021 0722 by Dawna Seals RN  Outcome: Not Met This Shift  Goal: Decrease in sensory misperception frequency  Description: Decrease in sensory misperception frequency  9/26/2021 0741 by Laurel Pace RN  Outcome: Ongoing  9/26/2021 0722 by Dawna Seals RN  Outcome: Not Met This Shift  Goal: Able to refrain from responding to false sensory perceptions  Description: Able to refrain from responding to false sensory perceptions  9/26/2021 0741 by Laurel Pace RN  Outcome: Ongoing  9/26/2021 0722 by Dawna Seals RN  Outcome: Ongoing  Goal: Demonstrates accurate environmental perceptions  Description: Demonstrates accurate environmental perceptions  9/26/2021 0741 by Laurel Pace RN  Outcome: Ongoing  9/26/2021 0722 by Dawna Seals RN  Outcome: Not Met This Shift  Goal: Able to distinguish between reality-based and nonreality-based thinking  Description: Able to distinguish between reality-based and nonreality-based thinking  9/26/2021 0741 by Magui Alicea RN  Outcome: Ongoing  9/26/2021 0722 by Dick Rose RN  Outcome: Not Met This Shift  Goal: Able to interrupt nonreality-based thinking  Description: Able to interrupt nonreality-based thinking  9/26/2021 0741 by Magui Alicea RN  Outcome: Ongoing  9/26/2021 0722 by Dick Rose RN  Outcome: Ongoing     Problem: Sleep Pattern Disturbance:  Goal: Appears well-rested  Description: Appears well-rested  9/26/2021 0741 by Magui Alicea RN  Outcome: Ongoing  9/26/2021 0722 by Dick Rose RN  Outcome: Ongoing     Problem: OXYGENATION/RESPIRATORY FUNCTION  Goal: Patient will maintain patent airway  9/26/2021 0741 by Magui Alicea RN  Outcome: Ongoing  9/26/2021 0722 by Dick Rose RN  Outcome: Ongoing  9/26/2021 0546 by Craig Rodriguez RCP  Outcome: Ongoing  Goal: Patient will achieve/maintain normal respiratory rate/effort  Description: Respiratory rate and effort will be within normal limits for the patient  9/26/2021 0741 by Magui Alicea RN  Outcome: Ongoing  9/26/2021 0722 by Dick Rose RN  Outcome: Ongoing  9/26/2021 0546 by Craig Rodriguez RCP  Outcome: Ongoing     Problem: MECHANICAL VENTILATION  Goal: Patient will maintain patent airway  9/26/2021 0741 by Magui Alicea RN  Outcome: Ongoing  9/26/2021 0722 by Dick Rose RN  Outcome: Ongoing  9/26/2021 0546 by Craig Rodriguez RCP  Outcome: Ongoing  Goal: Oral health is maintained or improved  9/26/2021 0741 by Magui Alicea RN  Outcome: Ongoing  9/26/2021 0722 by Dick Rose RN  Outcome: Ongoing  9/26/2021 0546 by Craig Rodriguez RCP  Outcome: Ongoing  Goal: ET tube will be managed safely  9/26/2021 0741 by Magui Alicea RN  Outcome: Ongoing  9/26/2021 0722 by Dick Rose RN  Outcome: Ongoing  9/26/2021 0546 by Craig Rodriguez RCP  Outcome: Ongoing  Goal: Ability to express needs and understand communication  9/26/2021 0741 by Erasmo Estrada RN  Outcome: Ongoing  9/26/2021 0722 by Johnathon Boxer, RN  Outcome: Ongoing  9/26/2021 0546 by Bert Alexandre RCP  Outcome: Ongoing  Goal: Mobility/activity is maintained at optimum level for patient  9/26/2021 0741 by Erasmo Estrada RN  Outcome: Ongoing  9/26/2021 0722 by Johnathon Boxer, RN  Outcome: Ongoing  9/26/2021 0546 by Bert Alexandre RCP  Outcome: Ongoing     Problem: SKIN INTEGRITY  Goal: Skin integrity is maintained or improved  9/26/2021 0741 by Erasmo Estrada RN  Outcome: Ongoing  9/26/2021 0722 by Johnathon Boxer, RN  Outcome: Ongoing

## 2021-09-26 NOTE — PLAN OF CARE
Problem: Nutrition  Goal: Optimal nutrition therapy  Outcome: Ongoing  Note: Nutrition Problem #1: Inadequate oral intake  Intervention: Food and/or Nutrient Delivery: Continue NPO  Nutritional Goals: Meet 75% or greater of estimated nutrition needs

## 2021-09-26 NOTE — PLAN OF CARE
Problem: Non-Violent Restraints  Goal: No harm/injury to patient while restraints in use  Outcome: Met This Shift  Goal: Patient's dignity will be maintained  Outcome: Met This Shift     Problem: Injury - Risk of, Physical Injury:  Goal: Absence of physical injury  Description: Absence of physical injury  Outcome: Met This Shift  Goal: Will remain free from falls  Description: Will remain free from falls  Outcome: Met This Shift     Problem: Discharge Planning:  Goal: Ability to perform activities of daily living will improve  Description: Ability to perform activities of daily living will improve  Outcome: Ongoing  Goal: Participates in care planning  Description: Participates in care planning  Outcome: Ongoing     Problem: Mood - Altered:  Goal: Mood stable  Description: Mood stable  Outcome: Ongoing  Goal: Absence of abusive behavior  Description: Absence of abusive behavior  Outcome: Ongoing  Goal: Verbalizations of feeling emotionally comfortable while being cared for will increase  Description: Verbalizations of feeling emotionally comfortable while being cared for will increase  Outcome: Ongoing     Problem: Psychomotor Activity - Altered:  Goal: Absence of psychomotor disturbance signs and symptoms  Description: Absence of psychomotor disturbance signs and symptoms  Outcome: Ongoing     Problem: Sensory Perception - Impaired:  Goal: Able to refrain from responding to false sensory perceptions  Description: Able to refrain from responding to false sensory perceptions  Outcome: Ongoing  Goal: Able to interrupt nonreality-based thinking  Description: Able to interrupt nonreality-based thinking  Outcome: Ongoing     Problem: Sleep Pattern Disturbance:  Goal: Appears well-rested  Description: Appears well-rested  Outcome: Ongoing     Problem: OXYGENATION/RESPIRATORY FUNCTION  Goal: Patient will maintain patent airway  9/26/2021 0722 by Todd Calzada RN  Outcome: Ongoing  9/26/2021 0546 by Redd Dumont SALLY  Outcome: Ongoing  Goal: Patient will achieve/maintain normal respiratory rate/effort  Description: Respiratory rate and effort will be within normal limits for the patient  9/26/2021 1513 by Todd Calzada RN  Outcome: Ongoing  9/26/2021 0546 by Redd Dumont RCP  Outcome: Ongoing     Problem: MECHANICAL VENTILATION  Goal: Patient will maintain patent airway  9/26/2021 0722 by Todd Calzada RN  Outcome: Ongoing  9/26/2021 0546 by Redd Dumont RCP  Outcome: Ongoing  Goal: Oral health is maintained or improved  9/26/2021 0722 by Todd Calzada RN  Outcome: Ongoing  9/26/2021 0546 by Redd Dumont RCP  Outcome: Ongoing  Goal: ET tube will be managed safely  9/26/2021 0722 by Todd Calzada RN  Outcome: Ongoing  9/26/2021 0546 by Redd Dumont RCP  Outcome: Ongoing  Goal: Ability to express needs and understand communication  9/26/2021 0722 by Todd Calzada RN  Outcome: Ongoing  9/26/2021 0546 by Redd Dumont RCP  Outcome: Ongoing  Goal: Mobility/activity is maintained at optimum level for patient  9/26/2021 8236 by Todd Calzada RN  Outcome: Ongoing  9/26/2021 0546 by Redd Dumont RCP  Outcome: Ongoing     Problem: SKIN INTEGRITY  Goal: Skin integrity is maintained or improved  Outcome: Ongoing     Problem: Non-Violent Restraints  Goal: Removal from restraints as soon as assessed to be safe  Outcome: Not Met This Shift     Problem: Confusion - Acute:  Goal: Absence of continued neurological deterioration signs and symptoms  Description: Absence of continued neurological deterioration signs and symptoms  Outcome: Not Met This Shift  Goal: Mental status will be restored to baseline  Description: Mental status will be restored to baseline  Outcome: Not Met This Shift     Problem: Sensory Perception - Impaired:  Goal: Demonstrations of improved sensory functioning will increase  Description: Demonstrations of improved sensory functioning will increase  Outcome: Not Met This Shift  Goal: Decrease in sensory misperception frequency  Description: Decrease in sensory misperception frequency  Outcome: Not Met This Shift  Goal: Demonstrates accurate environmental perceptions  Description: Demonstrates accurate environmental perceptions  Outcome: Not Met This Shift  Goal: Able to distinguish between reality-based and nonreality-based thinking  Description: Able to distinguish between reality-based and nonreality-based thinking  Outcome: Not Met This Shift

## 2021-09-26 NOTE — PROGRESS NOTES
1775(21.8)   1775(21.8)   Weight (kg) 81.6 81.6 81.6 81.6     Wt Readings from Last 3 Encounters:   09/26/21 179 lb 14.3 oz (81.6 kg)   09/24/21 165 lb (74.8 kg)   10/26/20 167 lb (75.8 kg)     Body mass index is 25.09 kg/m². PHYSICAL EXAM:  Constitutional: intubated, sedated  Respiratory: clear to auscultation, no wheezes or rales and unlabored breathing. Cardiovascular: regular rate and rhythm, normal S1, S2, no murmur noted and 2+ pulses throughout  Abdomen: soft, nontender, nondistended, no masses or organomegaly  NEUROLOGIC: Patient is intubated and sedated. Extremities:  peripheral pulses normal, no pedal edema,.       Any additional physical findings:      MEDICATIONS:  Scheduled Meds:   sodium chloride flush  5-40 mL IntraVENous 2 times per day    enoxaparin  40 mg SubCUTAneous Daily    famotidine  20 mg Oral BID    cefTRIAXone (ROCEPHIN) IV  1,000 mg IntraVENous Q24H    midazolam  5 mg IntraVENous Once     Continuous Infusions:   sodium chloride      dextrose 5 % and 0.45 % NaCl 125 mL/hr at 09/25/21 2342    propofol 50 mcg/kg/min (09/26/21 0629)     PRN Meds:   LORazepam, 4 mg, Q4H PRN  sodium chloride flush, 5-40 mL, PRN  sodium chloride, 25 mL, PRN  ondansetron, 4 mg, Q8H PRN   Or  ondansetron, 4 mg, Q6H PRN  polyethylene glycol, 17 g, Daily PRN  acetaminophen, 650 mg, Q6H PRN   Or  acetaminophen, 650 mg, Q6H PRN        SUPPORT DEVICES: [] Ventilator [] BIPAP  [] Nasal Cannula [] Room Air    VENT SETTINGS (Comprehensive) (if applicable):  Vent Information  $Ventilation: $Initial Day  Skin Assessment: Clean, dry, & intact  Suction Catheter Diameter: 14  Equipment ID: [id number]  Equipment Changed: Expiratory Filter (and HME)  Vent Type: Servo i  Vent Mode: PRVC  Vt Ordered: 530 mL  Rate Set: 16 bmp  FiO2 : 30 %  SpO2: 100 %  SpO2/FiO2 ratio: 333.33  Sensitivity: 5  PEEP/CPAP: 5  I Time/ I Time %: 0.9 s  Humidification Source: HME  Additional Respiratory  Assessments  Pulse: 78  Resp: 16  SpO2: 100 %  End Tidal CO2: 39 (%)  Position: Semi-Rucker's  Humidification Source: HME  Oral Care Completed?: Yes  Oral Care: Mouth swabbed, Mouth suctioned, Suction toothette  Subglottic Suction Done?: No  Cuff Pressure (cm H2O):  (MLT)  Skin barrier applied: Yes    ABGs:     Lab Results   Component Value Date    PMC7LNY NOT REPORTED 09/26/2021    FIO2 30.0 09/26/2021     Lactic Acid: No results found for: LACTA      DATA:  Complete Blood Count:   Recent Labs     09/25/21  0119 09/25/21  0438 09/26/21  0626   WBC 19.5* 19.6* 9.1   HGB 15.5 15.0 13.8   MCV 87.3 86.2 85.1    163 157   RBC 5.44 5.37 4.97   HCT 47.5 46.3 42.3   MCH 28.5 27.9 27.8   MCHC 32.6 32.4 32.6   RDW 13.0 12.9 13.2   MPV 9.0 9.2 9.5        PT/INR:  No results found for: PROTIME, INR  PTT:  No results found for: APTT, PTT    Basal Metabolic Profile:   Recent Labs     09/25/21  0119 09/25/21  0438 09/25/21  1556    137 140   K 4.1 3.9 4.0   BUN 13 13 11   CREATININE 1.00 0.86 0.94    101 104   CO2 21 22 24      Magnesium:   Lab Results   Component Value Date    MG 2.1 09/26/2021    MG 2.2 11/12/2019     Phosphorus: No results found for: PHOS  S. Calcium:  Recent Labs     09/25/21  1556   CALCIUM 8.4*     S. Ionized Calcium:No results for input(s): IONCA in the last 72 hours.       Urinalysis:   Lab Results   Component Value Date    NITRU NEGATIVE 09/25/2021    COLORU Dark Yellow 09/25/2021    PHUR 6.0 09/25/2021    WBCUA 2 TO 5 09/25/2021    RBCUA 5 TO 10 09/25/2021    MUCUS NOT REPORTED 09/25/2021    TRICHOMONAS NOT REPORTED 09/25/2021    YEAST NOT REPORTED 09/25/2021    BACTERIA NOT REPORTED 09/25/2021    SPECGRAV 1.029 09/25/2021    LEUKOCYTESUR NEGATIVE 09/25/2021    UROBILINOGEN Normal 09/25/2021    BILIRUBINUR NEGATIVE 09/25/2021    GLUCOSEU NEGATIVE 09/25/2021    KETUA TRACE 09/25/2021    AMORPHOUS NOT REPORTED 09/25/2021       CARDIAC ENZYMES: No results for input(s): CKMB, CKMBINDEX, TROPONINI in the last 72 hours.    Invalid input(s): CKTOTAL;3  BNP: No results for input(s): BNP in the last 72 hours. LFTS  Recent Labs     09/25/21  0119 09/25/21  0438   ALKPHOS 112 104   ALT 18 17   AST 35 33   BILITOT 0.88 0.80   LABALBU 4.5 4.3       AMYLASE/LIPASE/AMMONIA  Recent Labs     09/25/21  0119   LIPASE 15       Last 3 Blood Glucose:   Recent Labs     09/25/21  0119 09/25/21  0438 09/25/21  1556   GLUCOSE 90 95 104*      HgBA1c:  No results found for: LABA1C      TSH:  No results found for: TSH  ANEMIA STUDIES  No results for input(s): LABIRON, TIBC, FERRITIN, [id number], FOLATE, OCCULTBLD in the last 72 hours. Cultures during this admission:     Blood cultures:                 [] None drawn      [] Negative             []  Positive (Details:  )  Urine Culture:                   [] None drawn      [] Negative             []  Positive (Details:  )  Sputum Culture:               [] None drawn       [] Negative             []  Positive (Details:  )   Endotracheal aspirate:     [] None drawn       [] Negative             []  Positive (Details:  )        ASSESSMENT:     Active Problems:    Acute respiratory failure ([facility] [address]  Resolved Problems:    * No resolved hospital problems. *        PLAN:     Neuro:  -Move all extremities on sedation holiday  -Sedation: Propofol  Pain control: Tylenol    Cardiovascular:   BP is 102/64 MAP is 77  Heart rate is 70     Heme:  H&H stable    Respiratory:  CPAP:  RATE- 16, tidal volume 530, PIP 20, PEEP 5  On Azithromycin    Renal:   His urine output is 2.5 L /24HR. Electrolyte replacement as needed. Urine drug screen positive on vitamins, benzodiazepines, cannabis, methadone, methamphetamine, opioid. Infectious:  WBC count has normalized from 19.6-9.1. On azithromycin.     Endo:  Monitor glucose    Prophylaxis:  DVT: Lovenox, SCD  GI: Pepcid    Outside images: CT head CT cervical spine negative      Nico Begum MD,           Department of Internal Medicine/ Critical care  [facility] [address] [facility] [place] [facility]             9/26/2021, 7:30 AM

## 2021-09-26 NOTE — PROGRESS NOTES
Comprehensive Nutrition Assessment    Type and Reason for Visit:  Initial    Nutrition Recommendations/Plan:   -Monitor for start of nutrition. If TF, suggest Vital AF 1.2 (Peptide based) at goal rate of 40ml per hr (w/ Propofol at current rate) to provide 1152 kcal, 72gm protein/d. Add 1 Protein modular per day. Nutrition Assessment:  Admitted s/p drug overdose. Currently intubated/sedated. No plans to initiate nutrition    Malnutrition Assessment:  Malnutrition Status:  Insufficient data    Context:  Acute Illness     Findings of the 6 clinical characteristics of malnutrition:  Energy Intake:  Unable to assess  Weight Loss:  Unable to assess     Body Fat Loss:  Unable to assess     Muscle Mass Loss:  Unable to assess    Fluid Accumulation:  Unable to assess     Strength:  Not Performed    Estimated Daily Nutrient Needs:  Energy (kcal):  0222-2041 kcal/d (20-22 kcal/kg); Weight Used for Energy Requirements:  Current     Protein (g):   gm/d (1.2-1.4gm/kg); Weight Used for Protein Requirements:  Ideal        Fluid (ml/day):  2400ml/d (30ml/kg); Method Used for Fluid Requirements:  ml/Kg      Nutrition Related Findings:  labs/meds reviewed      Wounds:  None       Current Nutrition Therapies:    Diet NPO  Additional Calorie Sources:   Propofol at 22.4ml per ys=168 kcal/d    Anthropometric Measures:  · Height: 5' 10.98\" (180.3 cm)  · Current Body Weight: 179 lb 14.3 oz (81.6 kg)    · Ideal Body Weight: 172 lbs; % Ideal Body Weight 104.6 %   · BMI: 25.1  · BMI Categories: Overweight (BMI 25.0-29. 9)       Nutrition Diagnosis:   · Inadequate oral intake related to impaired respiratory function as evidenced by intubation, NPO or clear liquid status due to medical condition      Nutrition Interventions:   Food and/or Nutrient Delivery:  Continue NPO  Nutrition Education/Counseling:  Education not indicated   Coordination of Nutrition Care:  Continue to monitor while inpatient    Goals:  Meet 75% or greater of estimated nutrition needs       Nutrition Monitoring and Evaluation:   Behavioral-Environmental Outcomes:  None Identified   Food/Nutrient Intake Outcomes:  Diet Advancement/Tolerance  Physical Signs/Symptoms Outcomes:  Biochemical Data, Nutrition Focused Physical Findings, Weight     Discharge Planning:     Too soon to determine     Electronically signed by Michelle Avila RD, LD on 9/26/21 at 2:15 PM EDT    Contact: [phone] (3) walks occasionally

## 2021-09-26 NOTE — PROGRESS NOTES
09/26/21 0803   Vent Information   Vent Mode CPAP   Pressure Support 6 cmH20     Patient on SBT.  Tolerating well

## 2021-09-27 LAB
ABSOLUTE EOS #: 0.14 K/UL (ref 0–0.44)
ABSOLUTE IMMATURE GRANULOCYTE: 0.03 K/UL (ref 0–0.3)
ABSOLUTE LYMPH #: 1.92 K/UL (ref 1.1–3.7)
ABSOLUTE MONO #: 0.94 K/UL (ref 0.1–1.2)
ALBUMIN SERPL-MCNC: 3.5 G/DL (ref 3.5–5.2)
ALBUMIN/GLOBULIN RATIO: 1.2 (ref 1–2.5)
ALLEN TEST: ABNORMAL
ALP BLD-CCNC: 82 U/L (ref 40–129)
ALT SERPL-CCNC: 13 U/L (ref 5–41)
ANION GAP SERPL CALCULATED.3IONS-SCNC: 10 MMOL/L (ref 9–17)
AST SERPL-CCNC: 28 U/L
BASOPHILS # BLD: 1 % (ref 0–2)
BASOPHILS ABSOLUTE: 0.05 K/UL (ref 0–0.2)
BILIRUB SERPL-MCNC: 0.36 MG/DL (ref 0.3–1.2)
BUN BLDV-MCNC: 6 MG/DL (ref 6–20)
BUN/CREAT BLD: ABNORMAL (ref 9–20)
CALCIUM SERPL-MCNC: 8.7 MG/DL (ref 8.6–10.4)
CHLORIDE BLD-SCNC: 107 MMOL/L (ref 98–107)
CO2: 18 MMOL/L (ref 20–31)
CREAT SERPL-MCNC: 0.7 MG/DL (ref 0.7–1.2)
DIFFERENTIAL TYPE: ABNORMAL
EKG ATRIAL RATE: 88 BPM
EKG P AXIS: 69 DEGREES
EKG P-R INTERVAL: 136 MS
EKG Q-T INTERVAL: 356 MS
EKG QRS DURATION: 94 MS
EKG QTC CALCULATION (BAZETT): 430 MS
EKG R AXIS: 50 DEGREES
EKG T AXIS: 64 DEGREES
EKG VENTRICULAR RATE: 88 BPM
EOSINOPHILS RELATIVE PERCENT: 1 % (ref 1–4)
FIO2: 30
GFR AFRICAN AMERICAN: >60 ML/MIN
GFR NON-AFRICAN AMERICAN: >60 ML/MIN
GFR SERPL CREATININE-BSD FRML MDRD: ABNORMAL ML/MIN/{1.73_M2}
GFR SERPL CREATININE-BSD FRML MDRD: ABNORMAL ML/MIN/{1.73_M2}
GLUCOSE BLD-MCNC: 122 MG/DL (ref 70–99)
GLUCOSE BLD-MCNC: 136 MG/DL (ref 74–100)
HCT VFR BLD CALC: 47.8 % (ref 40.7–50.3)
HEMOGLOBIN: 14.3 G/DL (ref 13–17)
IMMATURE GRANULOCYTES: 0 %
LYMPHOCYTES # BLD: 19 % (ref 24–43)
MCH RBC QN AUTO: 28.1 PG (ref 25.2–33.5)
MCHC RBC AUTO-ENTMCNC: 29.9 G/DL (ref 28.4–34.8)
MCV RBC AUTO: 94.1 FL (ref 82.6–102.9)
MODE: ABNORMAL
MONOCYTES # BLD: 9 % (ref 3–12)
NEGATIVE BASE EXCESS, ART: ABNORMAL (ref 0–2)
NRBC AUTOMATED: 0 PER 100 WBC
O2 DEVICE/FLOW/%: ABNORMAL
PATIENT TEMP: ABNORMAL
PDW BLD-RTO: 12.9 % (ref 11.8–14.4)
PLATELET # BLD: 139 K/UL (ref 138–453)
PLATELET ESTIMATE: ABNORMAL
PMV BLD AUTO: 9.7 FL (ref 8.1–13.5)
POC HCO3: 24.4 MMOL/L (ref 21–28)
POC LACTIC ACID: 0.71 MMOL/L (ref 0.56–1.39)
POC O2 SATURATION: 99 % (ref 94–98)
POC PCO2 TEMP: ABNORMAL MM HG
POC PCO2: 33.9 MM HG (ref 35–48)
POC PH TEMP: ABNORMAL
POC PH: 7.46 (ref 7.35–7.45)
POC PO2 TEMP: ABNORMAL MM HG
POC PO2: 116.2 MM HG (ref 83–108)
POSITIVE BASE EXCESS, ART: 1 (ref 0–3)
POTASSIUM SERPL-SCNC: 4.5 MMOL/L (ref 3.7–5.3)
RBC # BLD: 5.08 M/UL (ref 4.21–5.77)
RBC # BLD: ABNORMAL 10*6/UL
SAMPLE SITE: ABNORMAL
SEG NEUTROPHILS: 70 % (ref 36–65)
SEGMENTED NEUTROPHILS ABSOLUTE COUNT: 7.07 K/UL (ref 1.5–8.1)
SODIUM BLD-SCNC: 135 MMOL/L (ref 135–144)
TCO2 (CALC), ART: ABNORMAL MMOL/L (ref 22–29)
TOTAL PROTEIN: 6.5 G/DL (ref 6.4–8.3)
WBC # BLD: 10.2 K/UL (ref 3.5–11.3)
WBC # BLD: ABNORMAL 10*3/UL

## 2021-09-27 PROCEDURE — 94003 VENT MGMT INPAT SUBQ DAY: CPT

## 2021-09-27 PROCEDURE — 93010 ELECTROCARDIOGRAM REPORT: CPT | Performed by: INTERNAL MEDICINE

## 2021-09-27 PROCEDURE — 99291 CRITICAL CARE FIRST HOUR: CPT | Performed by: INTERNAL MEDICINE

## 2021-09-27 PROCEDURE — 82803 BLOOD GASES ANY COMBINATION: CPT

## 2021-09-27 PROCEDURE — 83605 ASSAY OF LACTIC ACID: CPT

## 2021-09-27 PROCEDURE — 82947 ASSAY GLUCOSE BLOOD QUANT: CPT

## 2021-09-27 PROCEDURE — 6370000000 HC RX 637 (ALT 250 FOR IP): Performed by: STUDENT IN AN ORGANIZED HEALTH CARE EDUCATION/TRAINING PROGRAM

## 2021-09-27 PROCEDURE — 6360000002 HC RX W HCPCS: Performed by: STUDENT IN AN ORGANIZED HEALTH CARE EDUCATION/TRAINING PROGRAM

## 2021-09-27 PROCEDURE — 80053 COMPREHEN METABOLIC PANEL: CPT

## 2021-09-27 PROCEDURE — 2700000000 HC OXYGEN THERAPY PER DAY

## 2021-09-27 PROCEDURE — 36600 WITHDRAWAL OF ARTERIAL BLOOD: CPT

## 2021-09-27 PROCEDURE — 36415 COLL VENOUS BLD VENIPUNCTURE: CPT

## 2021-09-27 PROCEDURE — 2580000003 HC RX 258: Performed by: STUDENT IN AN ORGANIZED HEALTH CARE EDUCATION/TRAINING PROGRAM

## 2021-09-27 PROCEDURE — 94761 N-INVAS EAR/PLS OXIMETRY MLT: CPT

## 2021-09-27 PROCEDURE — 2500000003 HC RX 250 WO HCPCS: Performed by: STUDENT IN AN ORGANIZED HEALTH CARE EDUCATION/TRAINING PROGRAM

## 2021-09-27 PROCEDURE — 85025 COMPLETE CBC W/AUTO DIFF WBC: CPT

## 2021-09-27 PROCEDURE — 2000000000 HC ICU R&B

## 2021-09-27 RX ORDER — QUETIAPINE FUMARATE 25 MG/1
50 TABLET, FILM COATED ORAL 2 TIMES DAILY
Status: DISCONTINUED | OUTPATIENT
Start: 2021-09-27 | End: 2021-09-29 | Stop reason: HOSPADM

## 2021-09-27 RX ORDER — MIDAZOLAM HYDROCHLORIDE 2 MG/2ML
2 INJECTION, SOLUTION INTRAMUSCULAR; INTRAVENOUS EVERY 4 HOURS PRN
Status: DISCONTINUED | OUTPATIENT
Start: 2021-09-27 | End: 2021-09-29 | Stop reason: HOSPADM

## 2021-09-27 RX ORDER — MIDAZOLAM HYDROCHLORIDE 2 MG/2ML
4 INJECTION, SOLUTION INTRAMUSCULAR; INTRAVENOUS EVERY 4 HOURS PRN
Status: DISCONTINUED | OUTPATIENT
Start: 2021-09-27 | End: 2021-09-29 | Stop reason: HOSPADM

## 2021-09-27 RX ADMIN — DEXMEDETOMIDINE HYDROCHLORIDE 1.1 MCG/KG/HR: 100 INJECTION, SOLUTION INTRAVENOUS at 13:57

## 2021-09-27 RX ADMIN — LORAZEPAM 4 MG: 2 INJECTION INTRAMUSCULAR; INTRAVENOUS at 00:45

## 2021-09-27 RX ADMIN — DEXMEDETOMIDINE HYDROCHLORIDE 0.7 MCG/KG/HR: 100 INJECTION, SOLUTION INTRAVENOUS at 19:52

## 2021-09-27 RX ADMIN — ENOXAPARIN SODIUM 40 MG: 40 INJECTION SUBCUTANEOUS at 08:34

## 2021-09-27 RX ADMIN — QUETIAPINE FUMARATE 50 MG: 25 TABLET ORAL at 21:10

## 2021-09-27 RX ADMIN — LORAZEPAM 4 MG: 2 INJECTION INTRAMUSCULAR; INTRAVENOUS at 04:37

## 2021-09-27 RX ADMIN — DEXMEDETOMIDINE HYDROCHLORIDE 1.4 MCG/KG/HR: 100 INJECTION, SOLUTION INTRAVENOUS at 09:33

## 2021-09-27 RX ADMIN — LORAZEPAM 4 MG: 2 INJECTION INTRAMUSCULAR; INTRAVENOUS at 08:37

## 2021-09-27 RX ADMIN — CEFTRIAXONE SODIUM 1000 MG: 1 INJECTION, POWDER, FOR SOLUTION INTRAMUSCULAR; INTRAVENOUS at 08:35

## 2021-09-27 RX ADMIN — DEXTROSE AND SODIUM CHLORIDE: 5; 450 INJECTION, SOLUTION INTRAVENOUS at 05:47

## 2021-09-27 RX ADMIN — SODIUM CHLORIDE, PRESERVATIVE FREE 10 ML: 5 INJECTION INTRAVENOUS at 19:52

## 2021-09-27 RX ADMIN — PROPOFOL 15 MCG/KG/MIN: 10 INJECTION, EMULSION INTRAVENOUS at 01:57

## 2021-09-27 RX ADMIN — FAMOTIDINE 20 MG: 20 TABLET, FILM COATED ORAL at 21:10

## 2021-09-27 RX ADMIN — DEXMEDETOMIDINE HYDROCHLORIDE 1.2 MCG/KG/HR: 100 INJECTION, SOLUTION INTRAVENOUS at 01:54

## 2021-09-27 RX ADMIN — DEXMEDETOMIDINE HYDROCHLORIDE 1.3 MCG/KG/HR: 100 INJECTION, SOLUTION INTRAVENOUS at 05:47

## 2021-09-27 ASSESSMENT — PAIN SCALES - GENERAL
PAINLEVEL_OUTOF10: 0
PAINLEVEL_OUTOF10: 0

## 2021-09-27 ASSESSMENT — PULMONARY FUNCTION TESTS
PIF_VALUE: 20
PIF_VALUE: 22
PIF_VALUE: 11

## 2021-09-27 NOTE — PROGRESS NOTES
Critical Care Team - Daily Progress Note      Date and time: 9/27/2021 7:38 AM  Patient's name:  Arpit Little  Medical Record Number: [id number]  Patient's account/billing number: [de-identified]  Patient's YOB: 1980  Age: 42 y.o. Date of Admission: 9/24/2021 11:43 PM  Length of stay during current admission: 2      Primary Care Physician: Payton Cuba MD  ICU Attending Physician: Dr. Rubia Solorio Status: Full Code    Reason for ICU admission:   Chief Complaint   Patient presents with    Drug Overdose     Pt via life flight from [facility] for Heroin overdose. Pt received 16mg of narcan with no response. Pt intubated upon arrival.        Subjective:     OVERNIGHT EVENTS:     No acute events overnight. Afebrile and hemodynamically stable. Remains intubated and sedated with Precedex 1.4, propofol at 10, and Ativan 4 mg every 4 hours. Patient becomes extremely agitated when trying to wean him off sedation. BP: 121/81, MAP 98, pulse rate 62 normal sinus rhythm    pH 7.465, PO2-116.2, oxygen saturation 99, PCO2-33.9. Settings PRVC rate 16, tidal volume 530, PIP 18, PEEP 5    Urine output is 2.5 L per 24 hours.     AWAKE & FOLLOWING COMMANDS:  [x] No   [] Yes    CURRENT VENTILATION STATUS:     [x] Ventilator  [] BIPAP  [] Nasal Cannula [] Room Air      IF INTUBATED, ET TUBE MARKING AT LOWER LIP:       cms    SECRETIONS Amount:  [x] Small [] Moderate  [] Large  [] None  Color:     [x] White [] Colored  [] Bloody    SEDATION:  RAAS Score:  [x] Propofol gtt  [] Versed gtt  [] Ativan gtt   [] No Sedation    PARALYZED:  [] No    [x] Yes    DIARRHEA:                [x] No                [] Yes  (C. Difficile status: [] positive                                                                                                                       [] negative                                                                                                                     [] pending)    VASOPRESSORS:  [x] No Weight (kg) 82.5 82.5 82.5 82.5     Wt Readings from Last 3 Encounters:   09/27/21 181 lb 14.1 oz (82.5 kg)   09/24/21 165 lb (74.8 kg)   10/26/20 167 lb (75.8 kg)     Body mass index is 25.38 kg/m². PHYSICAL EXAM:  Constitutional: intubated, sedated. Respiratory: clear to auscultation on anterior and lateral chest.    Cardiovascular: regular rate and rhythm, normal S1, S2, no murmur noted and 2+ pulses throughout  Abdomen: soft, nontender, nondistended, no masses or organomegaly  NEUROLOGIC: patient is Intubated and sedated. Extremities:  peripheral pulses normal, no pedal edema,.       Any additional physical findings:      MEDICATIONS:  Scheduled Meds:   sodium chloride flush  5-40 mL IntraVENous 2 times per day    enoxaparin  40 mg SubCUTAneous Daily    famotidine  20 mg Oral BID    cefTRIAXone (ROCEPHIN) IV  1,000 mg IntraVENous Q24H    midazolam  5 mg IntraVENous Once     Continuous Infusions:   dexmedetomidine 1.4 mcg/kg/hr (09/27/21 0640)    sodium chloride      dextrose 5 % and 0.45 % NaCl 75 mL/hr at 09/27/21 0547    propofol 10 mcg/kg/min (09/27/21 0641)     PRN Meds:   LORazepam, 4 mg, Q4H PRN  sodium chloride flush, 5-40 mL, PRN  sodium chloride, 25 mL, PRN  ondansetron, 4 mg, Q8H PRN   Or  ondansetron, 4 mg, Q6H PRN  polyethylene glycol, 17 g, Daily PRN  acetaminophen, 650 mg, Q6H PRN   Or  acetaminophen, 650 mg, Q6H PRN        SUPPORT DEVICES: [] Ventilator [] BIPAP  [] Nasal Cannula [] Room Air    VENT SETTINGS (Comprehensive) (if applicable):  Vent Information  $Ventilation: $Subsequent Day  Skin Assessment: Clean, dry, & intact  Suction Catheter Diameter: 14  Equipment ID: [id number]  Equipment Changed: Expiratory Filter (and HME)  Vent Type: Servo i  Vent Mode: PRVC  Vt Ordered: 530 mL  Rate Set: 16 bmp  Pressure Support: 6 cmH20  FiO2 : 30 %  SpO2: 97 %  SpO2/FiO2 ratio: 326.67  Sensitivity: 5  PEEP/CPAP: 5  I Time/ I Time %: 0.9 s  Humidification Source: E  Nitric Oxide/Epoprostenol In Use?: No  Additional Respiratory  Assessments  Pulse: 62  Resp: 16  SpO2: 97 %  End Tidal CO2: 33 (%)  Position: Semi-Rucker's  Humidification Source: HME  Oral Care Completed?: Yes  Oral Care: Mouth swabbed, Mouth moisturizer, Mouth suctioned  Subglottic Suction Done?: No  Cuff Pressure (cm H2O):  (MLT)  Skin barrier applied: Yes    ABGs:     Lab Results   Component Value Date    JRJ1KPV NOT REPORTED 09/27/2021    FIO2 30.0 09/27/2021     Lactic Acid: No results found for: LACTA      DATA:  Complete Blood Count:   Recent Labs     09/25/21  0438 09/26/21  0626 09/27/21 0214   WBC 19.6* 9.1 10.2   HGB 15.0 13.8 14.3   MCV 86.2 85.1 94.1    157 139   RBC 5.37 4.97 5.08   HCT 46.3 42.3 47.8   MCH 27.9 27.8 28.1   MCHC 32.4 32.6 29.9   RDW 12.9 13.2 12.9   MPV 9.2 9.5 9.7        PT/INR:  No results found for: PROTIME, INR  PTT:  No results found for: APTT, PTT    Basal Metabolic Profile:   Recent Labs     09/25/21  1556 09/26/21 0626 09/27/21 0214    132* 135   K 4.0 3.4* 4.5   BUN 11 7 6   CREATININE 0.94 0.81 0.70    100 107   CO2 24 20 18*      Magnesium:   Lab Results   Component Value Date    MG 2.2 09/26/2021    MG 2.1 09/26/2021    MG 2.2 11/12/2019     Phosphorus: No results found for: PHOS  S. Calcium:  Recent Labs     09/27/21 0214   CALCIUM 8.7     S. Ionized Calcium:No results for input(s): IONCA in the last 72 hours.       Urinalysis:   Lab Results   Component Value Date    NITRU NEGATIVE 09/25/2021    COLORU Dark Yellow 09/25/2021    PHUR 6.0 09/25/2021    WBCUA 2 TO 5 09/25/2021    RBCUA 5 TO 10 09/25/2021    MUCUS NOT REPORTED 09/25/2021    TRICHOMONAS NOT REPORTED 09/25/2021    YEAST NOT REPORTED 09/25/2021    BACTERIA NOT REPORTED 09/25/2021    SPECGRAV 1.029 09/25/2021    LEUKOCYTESUR NEGATIVE 09/25/2021    UROBILINOGEN Normal 09/25/2021    BILIRUBINUR NEGATIVE 09/25/2021    GLUCOSEU NEGATIVE 09/25/2021    KETUA TRACE 09/25/2021    AMORPHOUS NOT REPORTED 09/25/2021       CARDIAC ENZYMES: No results for input(s): CKMB, CKMBINDEX, TROPONINI in the last 72 hours. Invalid input(s): CKTOTAL;3  BNP: No results for input(s): BNP in the last 72 hours. LFTS  Recent Labs     09/25/21  0119 09/25/21  0438 09/26/21  0626 09/27/21  0214   ALKPHOS 112 104 94 82   ALT 18 17 16 13   AST 35 33 39 28   BILITOT 0.88 0.80 0.46 0.36   LABALBU 4.5 4.3 3.7 3.5       AMYLASE/LIPASE/AMMONIA  Recent Labs     09/25/21  0119   LIPASE 15       Last 3 Blood Glucose:   Recent Labs     09/25/21  0119 09/25/21  0438 09/25/21  1556 09/26/21  0626 09/27/21  0214   GLUCOSE 90 95 104* 113* 122*      HgBA1c:  No results found for: LABA1C      TSH:  No results found for: TSH  ANEMIA STUDIES  No results for input(s): LABIRON, TIBC, FERRITIN, VYTATGAB86, FOLATE, OCCULTBLD in the last 72 hours. Cultures during this admission:     Blood cultures:                 [] None drawn      [] Negative             []  Positive (Details:  )  Urine Culture:                   [] None drawn      [] Negative             []  Positive (Details:  )  Sputum Culture:               [] None drawn       [] Negative             []  Positive (Details:  )   Endotracheal aspirate:     [] None drawn       [] Negative             []  Positive (Details:  )        ASSESSMENT:     Active Problems:    [facility] ([facility]    Polysubstance abuse ([facility]  Resolved Problems:    * No resolved hospital problems. *        PLAN:     Neuro:  -Move all extremities on sedation holiday  -Sedation: Propofol  Pain control: Tylenol     Cardiovascular:  BP: 121/81, MAP 98,   pulse rate 62 normal sinus rhythm    Heme:  H&H stable     Respiratory:  PRVC rate 16, tidal volume 530, PIP 18, PEEP 5  On Azithromycin     Renal:   His urine output is 2.5 L /24HR. Electrolyte replacement as needed.   Urine drug screen positive on vitamins, benzodiazepines, cannabis, methadone, methamphetamine, opioid.     Infectious:  WBC count has normalized from 19.6-9.1-10.2  On azithromycin.     Endo:  Monitor glucose     Prophylaxis:  DVT: Lovenox, SCD  GI: Pepcid     Outside images: CT head CT cervical spine negative            Marianne Mckeon MD, M.D. Department of Internal Medicine/ Critical care  [facility])             9/27/2021, 7:38 AM  Attending Physician Statement  I have discussed the care of Concepcion Fry, including pertinent history and exam findings,  with the resident. I have seen and examined the patient and the key elements of all parts of the encounter have been performed by me. I agree with the assessment, plan and orders as documented by the resident with additions . Extubated  Wean Precedex drip  DC Driscoll catheter         Total critical care time caring for this patient with life threatening, unstable organ failure, including direct patient contact, management of life support systems, review of data including imaging and labs, discussions with other team members and physicians at least 27   Min so far today, excluding procedures. Treatment plan Discussed with nursing staff in detail , all questions answered . Electronically signed by Nancy Rodrigues MD on   9/27/21 at 7:31 PM EDT    Please note that this chart was generated using voice recognition Dragon dictation software. Although every effort was made to ensure the accuracy of this automated transcription, some errors in transcription may have occurred.

## 2021-09-27 NOTE — CARE COORDINATION
PAtient is intubated currently, no family present at bedside. Called mother Sandra Bentley with list number it has been discontented. Will attempt to fine family in [address]. [facility] PCP to inquire about emergency contacts. I was informed that he has no Emergency contact listed. Will continue to assist it finding family.

## 2021-09-27 NOTE — FLOWSHEET NOTE
Pt niece Carlos Lei updated on pt's current condition. Carlos Lei informed writer pt's mother is in advanced stages of dementia and is doing poorly, father is  and is not legally . Carlos Lei thinks pt has a son between ages of 25and 23years old, all though she does not have anyway to currently reach him writer asked that she please have son call hospital as soon as possible as he would be pt's next of kin.    Electronically signed by Milton Gupta RN on 2021 at 5:47 PM

## 2021-09-27 NOTE — FLOWSHEET NOTE
Writer attempted to remove pt hoskins catheter, pt extremely agitated attempting to kick staff, Dr. Brenda Veras notified, okay to leave in until pt is less agitated.    Electronically signed by Venice Starr RN on 9/27/2021 at 2:17 PM

## 2021-09-27 NOTE — PLAN OF CARE
Ongoing     Problem: Mood - Altered:  Goal: Mood stable  Description: Mood stable  9/27/2021 0726 by Dionicio Storm RN  Outcome: Ongoing  9/26/2021 2016 by Mariama Lau RN  Outcome: Ongoing  Goal: Absence of abusive behavior  Description: Absence of abusive behavior  9/27/2021 0726 by Dionicio Storm RN  Outcome: Ongoing  9/26/2021 2016 by Mariama Lau RN  Outcome: Ongoing  Goal: Verbalizations of feeling emotionally comfortable while being cared for will increase  Description: Verbalizations of feeling emotionally comfortable while being cared for will increase  9/27/2021 0726 by Dionicio Storm RN  Outcome: Ongoing  9/26/2021 2016 by Mariama Lau RN  Outcome: Ongoing     Problem: Sensory Perception - Impaired:  Goal: Demonstrations of improved sensory functioning will increase  Description: Demonstrations of improved sensory functioning will increase  9/27/2021 0726 by Dionicio Storm RN  Outcome: Ongoing  9/26/2021 2016 by Mariama Lau RN  Outcome: Ongoing  Goal: Decrease in sensory misperception frequency  Description: Decrease in sensory misperception frequency  9/27/2021 0726 by Dionicio Storm RN  Outcome: Ongoing  9/26/2021 2016 by Mariaam Lau RN  Outcome: Ongoing  Goal: Able to refrain from responding to false sensory perceptions  Description: Able to refrain from responding to false sensory perceptions  9/27/2021 0726 by Dionicio Storm RN  Outcome: Ongoing  9/26/2021 2016 by Mariama Lau RN  Outcome: Ongoing  Goal: Demonstrates accurate environmental perceptions  Description: Demonstrates accurate environmental perceptions  9/27/2021 0726 by Dionicio Storm RN  Outcome: Ongoing  9/26/2021 2016 by Mariama Lau RN  Outcome: Ongoing  Goal: Able to distinguish between reality-based and nonreality-based thinking  Description: Able to distinguish between reality-based and nonreality-based thinking  9/27/2021 0726 by Dionicio Storm RN  Outcome: Ongoing  9/26/2021 2016 by Mariama Lau RN  Outcome: Ongoing  Goal: Able to interrupt nonreality-based thinking  Description: Able to interrupt nonreality-based thinking  9/27/2021 0726 by Yasmeen Pacheco RN  Outcome: Ongoing  9/26/2021 2016 by Lazarus Gutta, RN  Outcome: Ongoing

## 2021-09-27 NOTE — PLAN OF CARE
Problem: MECHANICAL VENTILATION  Goal: Patient will maintain patent airway  9/26/2021 2309 by Corby Brooks RCP  Outcome: Ongoing     Problem: MECHANICAL VENTILATION  Goal: Oral health is maintained or improved  9/26/2021 2309 by Corby Brooks RCP  Outcome: Ongoing     Problem: MECHANICAL VENTILATION  Goal: ET tube will be managed safely  9/26/2021 2309 by Corby Brooks RCP  Outcome: Ongoing     Problem: MECHANICAL VENTILATION  Goal: Ability to express needs and understand communication  9/26/2021 2309 by Corby Brooks RCP  Outcome: Ongoing     Problem: MECHANICAL VENTILATION  Goal: Mobility/activity is maintained at optimum level for patient  9/26/2021 2309 by Corby Brooks RCP  Outcome: Ongoing     Problem: OXYGENATION/RESPIRATORY FUNCTION  Goal: Patient will achieve/maintain normal respiratory rate/effort  Description: Respiratory rate and effort will be within normal limits for the patient  9/26/2021 2309 by Corby Brooks RCP  Outcome: Ongoing

## 2021-09-27 NOTE — PROGRESS NOTES
Physician Progress Note      PATIENTDacarmen Hdz  CSN #:                  [id number]  :                       1980  ADMIT DATE:       2021 11:43 PM  [address] [place] [place] DATE:  RESPONDING  PROVIDER #:        Brock [place]          QUERY TEXT:    Pt admitted with drug overdose. Pt noted to have AMS. If possible, please   document in the progress notes and discharge summary if you are evaluating and   / or treating any of the following: The medical record reflects the following:  Risk Factors: Polysubstance abuse  Clinical Indicators: UDS + for amphetamines, opiates, cocaine, benzos,   methamphetamines, and oxycodone. CC progress note: Remains encephalopathic. CTH negative for acute process. CC progress notes: he is withdrawing from   heroine so every time when he is weaned off propofol, he becomes very   agitated. Treatment: Narcan, Ativan, Intubation, Precedex Gtt, ICU, vent management,   CTH, labs/monitoring    Thank-you,  Kaye Huston RN, [email or web address]  Options provided:  -- Metabolic encephalopathy and opiate withdrawal.  -- Metabolic encephalopathy  -- Toxic encephalopathy  -- Toxic metabolic encephalopathy  -- Other - I will add my own diagnosis  -- Disagree - Not applicable / Not valid  -- Disagree - Clinically unable to determine / Unknown  -- Refer to Clinical Documentation Reviewer    PROVIDER RESPONSE TEXT:    This patient has metabolic encephalopathy and opiate withdrawal.    Query created by: Tramaine Moctezuma on 2021 7:03 AM      Electronically signed by:  Brock [place] 2021 11:31 AM

## 2021-09-27 NOTE — PLAN OF CARE
Problem: Non-Violent Restraints  Goal: Removal from restraints as soon as assessed to be safe  9/26/2021 2016 by Raulito Meza RN  Outcome: Not Met This Shift  9/26/2021 0741 by Paul Alvarado RN  Outcome: Ongoing  9/26/2021 0722 by Citlaly Boyd RN  Outcome: Not Met This Shift  Goal: No harm/injury to patient while restraints in use  9/26/2021 2016 by Raulito Meza RN  Outcome: Met This Shift  9/26/2021 0741 by Paul Alvarado RN  Outcome: Ongoing  9/26/2021 0722 by Citlaly Boyd RN  Outcome: Met This Shift  Goal: Patient's dignity will be maintained  9/26/2021 2016 by Raulito Meza RN  Outcome: Met This Shift  9/26/2021 0741 by Paul Alvarado RN  Outcome: Ongoing  9/26/2021 0722 by Citlaly Boyd RN  Outcome: Met This Shift     Problem: Confusion - Acute:  Goal: Absence of continued neurological deterioration signs and symptoms  Description: Absence of continued neurological deterioration signs and symptoms  9/26/2021 2016 by Raulito Meza RN  Outcome: Ongoing  9/26/2021 0741 by Paul Alvarado RN  Outcome: Ongoing  9/26/2021 0722 by Citlaly Boyd RN  Outcome: Not Met This Shift  Goal: Mental status will be restored to baseline  Description: Mental status will be restored to baseline  9/26/2021 2016 by Raulito Meza RN  Outcome: Ongoing  9/26/2021 0741 by Paul Alvarado RN  Outcome: Ongoing  9/26/2021 0722 by Citlaly Boyd RN  Outcome: Not Met This Shift     Problem: Discharge Planning:  Goal: Ability to perform activities of daily living will improve  Description: Ability to perform activities of daily living will improve  9/26/2021 2016 by Raulito Meza RN  Outcome: Ongoing  9/26/2021 0741 by Paul Alvarado RN  Outcome: Ongoing  9/26/2021 0722 by Citlaly Boyd RN  Outcome: Ongoing  Goal: Participates in care planning  Description: Participates in care planning  9/26/2021 2016 by Raulito Meza RN  Outcome: Ongoing  9/26/2021 0741 by Paul Alvarado RN  Outcome: Ongoing  9/26/2021 0722 by Citlaly Boyd RN  Outcome: Ongoing Problem: Injury - Risk of, Physical Injury:  Goal: Absence of physical injury  Description: Absence of physical injury  9/26/2021 2016 by Mariann Gregory RN  Outcome: Ongoing  9/26/2021 0741 by Teresa Mims RN  Outcome: Ongoing  9/26/2021 0722 by Jimmy Frank RN  Outcome: Met This Shift  Goal: Will remain free from falls  Description: Will remain free from falls  9/26/2021 2016 by Mariann Gregory RN  Outcome: Ongoing  9/26/2021 0741 by Teresa Mims RN  Outcome: Ongoing  9/26/2021 0722 by Jimmy Frank RN  Outcome: Met This Shift     Problem: Mood - Altered:  Goal: Mood stable  Description: Mood stable  9/26/2021 2016 by Mariann Gregory RN  Outcome: Ongoing  9/26/2021 0741 by Teresa Mims RN  Outcome: Ongoing  9/26/2021 0722 by Jimmy Frank RN  Outcome: Ongoing  Goal: Absence of abusive behavior  Description: Absence of abusive behavior  9/26/2021 2016 by Marainn Gregory RN  Outcome: Ongoing  9/26/2021 0741 by Teresa Mims RN  Outcome: Ongoing  9/26/2021 0722 by Jimmy Frank RN  Outcome: Ongoing  Goal: Verbalizations of feeling emotionally comfortable while being cared for will increase  Description: Verbalizations of feeling emotionally comfortable while being cared for will increase  9/26/2021 2016 by Mariann Gregory RN  Outcome: Ongoing  9/26/2021 0741 by Teresa Mims RN  Outcome: Ongoing  9/26/2021 0722 by Jimmy Frank RN  Outcome: Ongoing     Problem: Psychomotor Activity - Altered:  Goal: Absence of psychomotor disturbance signs and symptoms  Description: Absence of psychomotor disturbance signs and symptoms  9/26/2021 2016 by Mariann Gregory RN  Outcome: Ongoing  9/26/2021 0741 by Teresa Mims RN  Outcome: Ongoing  9/26/2021 0722 by Jimmy Frank RN  Outcome: Ongoing     Problem: Sensory Perception - Impaired:  Goal: Demonstrations of improved sensory functioning will increase  Description: Demonstrations of improved sensory functioning will increase  9/26/2021 2016 by Mariann Gregory RN  Outcome: Ongoing  9/26/2021 0741 by Corrine Mcdonald RN  Outcome: Ongoing  9/26/2021 0722 by Orestes Teixeira RN  Outcome: Not Met This Shift  Goal: Decrease in sensory misperception frequency  Description: Decrease in sensory misperception frequency  9/26/2021 2016 by Jalil Franco RN  Outcome: Ongoing  9/26/2021 0741 by Corrine Mcdonald RN  Outcome: Ongoing  9/26/2021 0722 by Orestes Teixeira RN  Outcome: Not Met This Shift  Goal: Able to refrain from responding to false sensory perceptions  Description: Able to refrain from responding to false sensory perceptions  9/26/2021 2016 by Jalil Franco RN  Outcome: Ongoing  9/26/2021 0741 by Corrine Mcdonald RN  Outcome: Ongoing  9/26/2021 0722 by Orestes Teixeira RN  Outcome: Ongoing  Goal: Demonstrates accurate environmental perceptions  Description: Demonstrates accurate environmental perceptions  9/26/2021 2016 by Jalil Franco RN  Outcome: Ongoing  9/26/2021 0741 by Corrine Mcdonald RN  Outcome: Ongoing  9/26/2021 0722 by Orestes Teixeira RN  Outcome: Not Met This Shift  Goal: Able to distinguish between reality-based and nonreality-based thinking  Description: Able to distinguish between reality-based and nonreality-based thinking  9/26/2021 2016 by Jalil Franco RN  Outcome: Ongoing  9/26/2021 0741 by Corrine Mcdonald RN  Outcome: Ongoing  9/26/2021 0722 by Orestes Teixeira RN  Outcome: Not Met This Shift  Goal: Able to interrupt nonreality-based thinking  Description: Able to interrupt nonreality-based thinking  9/26/2021 2016 by Jalil Franco RN  Outcome: Ongoing  9/26/2021 0741 by Corrine Mcdonald RN  Outcome: Ongoing  9/26/2021 0722 by Orestes Teixeira RN  Outcome: Ongoing     Problem: Sleep Pattern Disturbance:  Goal: Appears well-rested  Description: Appears well-rested  9/26/2021 2016 by Jalil Franco RN  Outcome: Ongoing  9/26/2021 0741 by Corrine Mcdonald RN  Outcome: Ongoing  9/26/2021 0722 by Orestes Teixeira RN  Outcome: Ongoing     Problem: OXYGENATION/RESPIRATORY FUNCTION  Goal: Patient will maintain patent Ongoing  9/26/2021 0722 by Orestes Teixeira RN  Outcome: Ongoing     Problem: SKIN INTEGRITY  Goal: Skin integrity is maintained or improved  9/26/2021 2016 by Jalil Franco RN  Outcome: Ongoing  9/26/2021 0811 by Mik Cooper RCP  Outcome: Ongoing  9/26/2021 0741 by Corrine Mcdonald RN  Outcome: Ongoing  9/26/2021 0722 by Orestes Teixeira RN  Outcome: Ongoing     Problem: Nutrition  Goal: Optimal nutrition therapy  9/26/2021 2016 by Jalil Franco RN  Outcome: Ongoing  9/26/2021 1418 by Dawson Morales RD, LD  Outcome: Ongoing  Note: Nutrition Problem #1: Inadequate oral intake  Intervention: Food and/or Nutrient Delivery: Continue NPO  Nutritional Goals: Meet 75% or greater of estimated nutrition needs

## 2021-09-27 NOTE — PLAN OF CARE
Problem: Non-Violent Restraints  Goal: Removal from restraints as soon as assessed to be safe  9/27/2021 1914 by Josi Rodriguez RN  Outcome: Not Met This Shift  9/27/2021 0726 by La Maier RN  Outcome: Ongoing  Goal: No harm/injury to patient while restraints in use  9/27/2021 1914 by Josi Rodriguez RN  Outcome: Met This Shift  9/27/2021 0726 by La Maier RN  Outcome: Ongoing  Goal: Patient's dignity will be maintained  9/27/2021 1914 by Josi Rodriguez RN  Outcome: Met This Shift  9/27/2021 0726 by La Maier RN  Outcome: Met This Shift     Problem: Confusion - Acute:  Goal: Absence of continued neurological deterioration signs and symptoms  Description: Absence of continued neurological deterioration signs and symptoms  9/27/2021 1914 by Josi Rodriguez RN  Outcome: Ongoing  9/27/2021 0726 by La Maier RN  Outcome: Ongoing  Goal: Mental status will be restored to baseline  Description: Mental status will be restored to baseline  9/27/2021 1914 by Josi Rodriguez RN  Outcome: Ongoing  9/27/2021 0726 by La Maier RN  Outcome: Ongoing     Problem: Discharge Planning:  Goal: Ability to perform activities of daily living will improve  Description: Ability to perform activities of daily living will improve  9/27/2021 1914 by Josi Rodriguez RN  Outcome: Ongoing  9/27/2021 0726 by La Maier RN  Outcome: Ongoing  Goal: Participates in care planning  Description: Participates in care planning  9/27/2021 1914 by Josi Rodriguez RN  Outcome: Ongoing  9/27/2021 0726 by La Maier RN  Outcome: Ongoing     Problem: Injury - Risk of, Physical Injury:  Goal: Absence of physical injury  Description: Absence of physical injury  9/27/2021 1914 by Josi Rodriguez RN  Outcome: Ongoing  9/27/2021 0726 by La Maier RN  Outcome: Ongoing  Goal: Will remain free from falls  Description: Will remain free from falls  9/27/2021 1914 by Josi Rodriguez RN  Outcome: Ongoing  9/27/2021 0726 by La Maier RN  Outcome: Ongoing     Problem: Mood - Altered:  Goal: Mood stable  Description: Mood stable  9/27/2021 1914 by Kd Cantu RN  Outcome: Ongoing  9/27/2021 0726 by Racheal Hung RN  Outcome: Ongoing  Goal: Absence of abusive behavior  Description: Absence of abusive behavior  9/27/2021 1914 by Kd Cantu RN  Outcome: Ongoing  9/27/2021 0726 by Racheal Hung RN  Outcome: Ongoing  Goal: Verbalizations of feeling emotionally comfortable while being cared for will increase  Description: Verbalizations of feeling emotionally comfortable while being cared for will increase  9/27/2021 1914 by Kd Cantu RN  Outcome: Ongoing  9/27/2021 0726 by Racheal Hung RN  Outcome: Ongoing     Problem: Psychomotor Activity - Altered:  Goal: Absence of psychomotor disturbance signs and symptoms  Description: Absence of psychomotor disturbance signs and symptoms  9/27/2021 1914 by Kd Cantu RN  Outcome: Ongoing  9/27/2021 0726 by Racheal Hung RN  Outcome: Ongoing     Problem: Sensory Perception - Impaired:  Goal: Demonstrations of improved sensory functioning will increase  Description: Demonstrations of improved sensory functioning will increase  9/27/2021 1914 by Kd Cantu RN  Outcome: Ongoing  9/27/2021 0726 by Racheal Hung RN  Outcome: Ongoing  Goal: Decrease in sensory misperception frequency  Description: Decrease in sensory misperception frequency  9/27/2021 1914 by Kd Cantu RN  Outcome: Ongoing  9/27/2021 0726 by Racheal Hung RN  Outcome: Ongoing  Goal: Able to refrain from responding to false sensory perceptions  Description: Able to refrain from responding to false sensory perceptions  9/27/2021 1914 by Kd Cantu RN  Outcome: Ongoing  9/27/2021 0726 by Racheal Hung RN  Outcome: Ongoing  Goal: Demonstrates accurate environmental perceptions  Description: Demonstrates accurate environmental perceptions  9/27/2021 1914 by Kd Cantu RN  Outcome: Ongoing  9/27/2021 0726 by Racheal Hung RN  Outcome: Ongoing  Goal: Able to distinguish between reality-based and nonreality-based thinking  Description: Able to distinguish between reality-based and nonreality-based thinking  9/27/2021 1914 by Terrell Vasquez RN  Outcome: Ongoing  9/27/2021 0726 by Steph Ocasio RN  Outcome: Ongoing  Goal: Able to interrupt nonreality-based thinking  Description: Able to interrupt nonreality-based thinking  9/27/2021 1914 by Terrell Vasquez RN  Outcome: Ongoing  9/27/2021 0726 by Steph Ocasio RN  Outcome: Ongoing     Problem: Sleep Pattern Disturbance:  Goal: Appears well-rested  Description: Appears well-rested  9/27/2021 1914 by Terrell Vasquez RN  Outcome: Ongoing  9/27/2021 0726 by Steph Ocasio RN  Outcome: Ongoing     Problem: OXYGENATION/RESPIRATORY FUNCTION  Goal: Patient will maintain patent airway  9/27/2021 1914 by Terrell Vasquez RN  Outcome: Ongoing  9/27/2021 0726 by Steph Ocasio RN  Outcome: Ongoing  Goal: Patient will achieve/maintain normal respiratory rate/effort  Description: Respiratory rate and effort will be within normal limits for the patient  9/27/2021 1914 by Terrell Vasquez RN  Outcome: Ongoing  9/27/2021 0726 by Steph Ocasio RN  Outcome: Ongoing     Problem: MECHANICAL VENTILATION  Goal: Patient will maintain patent airway  9/27/2021 1914 by Terrell Vasquez RN  Outcome: Ongoing  9/27/2021 0726 by Steph Ocasio RN  Outcome: Ongoing  Goal: Oral health is maintained or improved  9/27/2021 1914 by Tererll Vasquez RN  Outcome: Ongoing  9/27/2021 0726 by Steph Ocasio RN  Outcome: Ongoing  Goal: ET tube will be managed safely  9/27/2021 1914 by Terrell Vasquez RN  Outcome: Ongoing  9/27/2021 0726 by Steph Ocasio RN  Outcome: Ongoing  Goal: Ability to express needs and understand communication  9/27/2021 1914 by Terrell Vasquez RN  Outcome: Ongoing  9/27/2021 0726 by Steph Ocasio RN  Outcome: Ongoing  Goal: Mobility/activity is maintained at optimum level for patient  9/27/2021 1914 by Terrell Vasquez RN  Outcome: Ongoing  9/27/2021 0726 by Steph Ocasio RN  Outcome: Ongoing     Problem: SKIN INTEGRITY  Goal: Skin integrity is maintained or improved  9/27/2021 1914 by Terrell Vasquez RN  Outcome: Ongoing  9/27/2021 0726 by Steph Ocasio RN  Outcome: Ongoing     Problem: Nutrition  Goal: Optimal nutrition therapy  9/27/2021 1914 by Terrell Vasquez RN  Outcome: Ongoing  9/27/2021 0726 by Steph Ocasio RN  Outcome: Ongoing

## 2021-09-28 VITALS
HEART RATE: 91 BPM | OXYGEN SATURATION: 98 % | WEIGHT: 175.27 LBS | RESPIRATION RATE: 19 BRPM | HEIGHT: 71 IN | DIASTOLIC BLOOD PRESSURE: 142 MMHG | TEMPERATURE: 98.8 F | SYSTOLIC BLOOD PRESSURE: 158 MMHG | BODY MASS INDEX: 24.54 KG/M2

## 2021-09-28 LAB
ABSOLUTE EOS #: 0.14 K/UL (ref 0–0.44)
ABSOLUTE IMMATURE GRANULOCYTE: 0.05 K/UL (ref 0–0.3)
ABSOLUTE LYMPH #: 2.48 K/UL (ref 1.1–3.7)
ABSOLUTE MONO #: 0.67 K/UL (ref 0.1–1.2)
ALBUMIN SERPL-MCNC: 3.8 G/DL (ref 3.5–5.2)
ALBUMIN/GLOBULIN RATIO: 1.2 (ref 1–2.5)
ALP BLD-CCNC: 92 U/L (ref 40–129)
ALT SERPL-CCNC: 16 U/L (ref 5–41)
ANION GAP SERPL CALCULATED.3IONS-SCNC: 12 MMOL/L (ref 9–17)
AST SERPL-CCNC: 39 U/L
BASOPHILS # BLD: 1 % (ref 0–2)
BASOPHILS ABSOLUTE: 0.05 K/UL (ref 0–0.2)
BILIRUB SERPL-MCNC: 0.4 MG/DL (ref 0.3–1.2)
BUN BLDV-MCNC: 6 MG/DL (ref 6–20)
BUN/CREAT BLD: NORMAL (ref 9–20)
CALCIUM SERPL-MCNC: 8.9 MG/DL (ref 8.6–10.4)
CHLORIDE BLD-SCNC: 106 MMOL/L (ref 98–107)
CO2: 22 MMOL/L (ref 20–31)
CREAT SERPL-MCNC: 0.8 MG/DL (ref 0.7–1.2)
DIFFERENTIAL TYPE: ABNORMAL
EOSINOPHILS RELATIVE PERCENT: 1 % (ref 1–4)
GFR AFRICAN AMERICAN: >60 ML/MIN
GFR NON-AFRICAN AMERICAN: >60 ML/MIN
GFR SERPL CREATININE-BSD FRML MDRD: NORMAL ML/MIN/{1.73_M2}
GFR SERPL CREATININE-BSD FRML MDRD: NORMAL ML/MIN/{1.73_M2}
GLUCOSE BLD-MCNC: 97 MG/DL (ref 70–99)
HCT VFR BLD CALC: 42 % (ref 40.7–50.3)
HEMOGLOBIN: 13.8 G/DL (ref 13–17)
IMMATURE GRANULOCYTES: 1 %
LYMPHOCYTES # BLD: 26 % (ref 24–43)
MCH RBC QN AUTO: 28 PG (ref 25.2–33.5)
MCHC RBC AUTO-ENTMCNC: 32.9 G/DL (ref 28.4–34.8)
MCV RBC AUTO: 85.4 FL (ref 82.6–102.9)
MONOCYTES # BLD: 7 % (ref 3–12)
NRBC AUTOMATED: 0 PER 100 WBC
PDW BLD-RTO: 12.6 % (ref 11.8–14.4)
PLATELET # BLD: 210 K/UL (ref 138–453)
PLATELET ESTIMATE: ABNORMAL
PMV BLD AUTO: 9.7 FL (ref 8.1–13.5)
POTASSIUM SERPL-SCNC: 3.9 MMOL/L (ref 3.7–5.3)
RBC # BLD: 4.92 M/UL (ref 4.21–5.77)
RBC # BLD: ABNORMAL 10*6/UL
SEG NEUTROPHILS: 65 % (ref 36–65)
SEGMENTED NEUTROPHILS ABSOLUTE COUNT: 6.35 K/UL (ref 1.5–8.1)
SODIUM BLD-SCNC: 140 MMOL/L (ref 135–144)
TOTAL PROTEIN: 7.1 G/DL (ref 6.4–8.3)
TRIGL SERPL-MCNC: 92 MG/DL
WBC # BLD: 9.7 K/UL (ref 3.5–11.3)
WBC # BLD: ABNORMAL 10*3/UL

## 2021-09-28 PROCEDURE — 2580000003 HC RX 258: Performed by: STUDENT IN AN ORGANIZED HEALTH CARE EDUCATION/TRAINING PROGRAM

## 2021-09-28 PROCEDURE — 6360000002 HC RX W HCPCS: Performed by: STUDENT IN AN ORGANIZED HEALTH CARE EDUCATION/TRAINING PROGRAM

## 2021-09-28 PROCEDURE — 2700000000 HC OXYGEN THERAPY PER DAY

## 2021-09-28 PROCEDURE — 94761 N-INVAS EAR/PLS OXIMETRY MLT: CPT

## 2021-09-28 PROCEDURE — 99232 SBSQ HOSP IP/OBS MODERATE 35: CPT | Performed by: INTERNAL MEDICINE

## 2021-09-28 PROCEDURE — 6370000000 HC RX 637 (ALT 250 FOR IP): Performed by: STUDENT IN AN ORGANIZED HEALTH CARE EDUCATION/TRAINING PROGRAM

## 2021-09-28 PROCEDURE — 2500000003 HC RX 250 WO HCPCS: Performed by: STUDENT IN AN ORGANIZED HEALTH CARE EDUCATION/TRAINING PROGRAM

## 2021-09-28 PROCEDURE — 85025 COMPLETE CBC W/AUTO DIFF WBC: CPT

## 2021-09-28 PROCEDURE — 84478 ASSAY OF TRIGLYCERIDES: CPT

## 2021-09-28 PROCEDURE — 80053 COMPREHEN METABOLIC PANEL: CPT

## 2021-09-28 RX ORDER — GABAPENTIN 800 MG/1
800 TABLET ORAL 3 TIMES DAILY
Status: DISCONTINUED | OUTPATIENT
Start: 2021-09-28 | End: 2021-09-29 | Stop reason: HOSPADM

## 2021-09-28 RX ORDER — 0.9 % SODIUM CHLORIDE 0.9 %
1000 INTRAVENOUS SOLUTION INTRAVENOUS ONCE
Status: COMPLETED | OUTPATIENT
Start: 2021-09-28 | End: 2021-09-28

## 2021-09-28 RX ADMIN — DEXTROSE AND SODIUM CHLORIDE: 5; 450 INJECTION, SOLUTION INTRAVENOUS at 13:40

## 2021-09-28 RX ADMIN — MIDAZOLAM HYDROCHLORIDE 2 MG: 1 INJECTION, SOLUTION INTRAMUSCULAR; INTRAVENOUS at 03:08

## 2021-09-28 RX ADMIN — SODIUM CHLORIDE 1000 ML: 9 INJECTION, SOLUTION INTRAVENOUS at 10:33

## 2021-09-28 RX ADMIN — DEXMEDETOMIDINE HYDROCHLORIDE 0.6 MCG/KG/HR: 100 INJECTION, SOLUTION INTRAVENOUS at 03:27

## 2021-09-28 RX ADMIN — GABAPENTIN 800 MG: 800 TABLET ORAL at 14:25

## 2021-09-28 RX ADMIN — CEFTRIAXONE SODIUM 1000 MG: 1 INJECTION, POWDER, FOR SOLUTION INTRAMUSCULAR; INTRAVENOUS at 08:11

## 2021-09-28 RX ADMIN — QUETIAPINE FUMARATE 50 MG: 25 TABLET ORAL at 08:11

## 2021-09-28 ASSESSMENT — PAIN SCALES - GENERAL
PAINLEVEL_OUTOF10: 0
PAINLEVEL_OUTOF10: 0
PAINLEVEL_OUTOF10: 6
PAINLEVEL_OUTOF10: 0

## 2021-09-28 ASSESSMENT — PAIN DESCRIPTION - PAIN TYPE: TYPE: NEUROPATHIC PAIN

## 2021-09-28 ASSESSMENT — ENCOUNTER SYMPTOMS
RESPIRATORY NEGATIVE: 1
GASTROINTESTINAL NEGATIVE: 1

## 2021-09-28 ASSESSMENT — PAIN DESCRIPTION - LOCATION: LOCATION: FOOT;LEG

## 2021-09-28 NOTE — PLAN OF CARE
will increase  9/28/2021 0851 by Venice Starr RN  Outcome: Ongoing  9/27/2021 1914 by Maeve Russell RN  Outcome: Ongoing     Problem: Psychomotor Activity - Altered:  Goal: Absence of psychomotor disturbance signs and symptoms  Description: Absence of psychomotor disturbance signs and symptoms  9/28/2021 0851 by Venice Starr RN  Outcome: Ongoing  9/27/2021 1914 by Maeve Russell RN  Outcome: Ongoing

## 2021-09-28 NOTE — CARE COORDINATION
Discharge [facility] Case Management Department  Written by: Jessica Marcos RN    Patient Name: Ekaterina Marx  Attending Provider: Kailee Spear MD  Admit Date: 2021 11:43 PM  MRN: [id number]  Account: [de-identified]                     : 1980  Discharge Date: 21  [facility] was on [facility] for 20 minutes. Anabel SANTIZO approved [employer] to take patient to [address] [place] [address]. conf # [id number]  @ 5pm main entrance.      Disposition: home    Jessica Marcos RN

## 2021-09-28 NOTE — DISCHARGE SUMMARY
[facility]     Department of Internal Medicine - Critical Care Service    INPATIENT DISCHARGE SUMMARY      PATIENT IDENTIFICATION:  NAME:  Tre Johnston   :   1980  MRN:    [id number]     Acct:    [de-identified]   Admit Date:  2021  Discharge date:  No discharge date for patient encounter. Attending Provider: Reta Merrill MD                                     Active Problems:    Acute respiratory failure ([facility] [address]    Polysubstance abuse ([facility] [address]  Resolved Problems:    * No resolved hospital problems. *       REASON FOR HOSPITALIZATION:   Chief Complaint   Patient presents with    Drug Overdose     Pt via life flight from [facility] for Heroin overdose. Pt received 16mg of narcan with no response. Pt intubated upon arrival.           Chester Dillard is a 43 yo M with PMH drug use and abuse, anxiety and depression. Presented to the emergency room at [facility] after being found down for unknown amount of time. Patient had altered mental status. Patient given 60 mg of Narcan at [facility] and ultimately intubated for airway protection. Patient transferred to our facility on .     : Patient weaned off of Precedex and propofol. Patient extubated. : Patient alert and oriented x4, following commands, tolerating PO. Patient discharged home.       Consults:   none    Procedures: None    Any Hospital Acquired Infections: None    PATIENT'S DISCHARGE CONDITION: Stable    PATIENT/FAMILY INSTRUCTIONS:    Current Discharge Medication List      CONTINUE these medications which have NOT CHANGED    Details   ibuprofen (IBU) 600 MG tablet Take 1 tablet by mouth every 6 hours as needed for Pain  Qty: 30 tablet, Refills: 0      buPROPion (WELLBUTRIN SR) 150 MG extended release tablet Take 1 tablet by mouth 2 times daily  Qty: 14 tablet, Refills: 0    Associated Diagnoses: Anxiety and depression      gabapentin (NEURONTIN) 800 MG tablet Take 1 tablet by mouth three times daily for 14 days. Donecarissa Bailey: 21 tablet, Refills: 0           Activity: activity as tolerated    Diet: regular diet    Disposition: home    Follow-up:  in the next few days with Mk Dubon MD    Electronically signed by Brian Carreno DO on 9/28/2021 at 2:48 PM     Time Spent on discharge is more than 1 hour in the examination, evaluation, counseling and review of medications and discharge plan.       Brian Carreno DO  Emergency Medicine Resident  Critical Care Service

## 2021-09-28 NOTE — FLOWSHEET NOTE
Assessment: Patient is a 42 y.o. male who arrived to the hospital due to a \"drug overdose. \" Per report, patient was \"extubated,\" earlier today. Intervention:  visited unit per other  referral, requesting assistance in locating patient's family.  gathered patient information on unit.  began searching for names and phone numbers for possible family members.  visited with bedside nurse and learned that patient's son was located and identified during previous shift. Per bedside nurse, patient's son, Mami Bhatia ([phone]), was contacted and updated today. Plan: Chaplains can make follow-up visit, per request. Johnny Bell can be reached 24/7 via Liam Chen       09/27/21 2131   Encounter Summary   Services provided to: Patient not available  (Nurse)   Referral/Consult From: Other    Support System Children   Complexity of Encounter Moderate   Length of Encounter 30 minutes   Routine   Type Initial   Spiritual/Yazidism   Type Spiritual support   Intervention Sustaining presence/ Ministry of presence

## 2021-09-28 NOTE — PROGRESS NOTES
INTENSIVE CARE UNIT  Resident Physician Progress Note    Patient - Greg Dooley  Date of Admission -  9/24/2021 11:43 PM  Date of Evaluation -  9/28/2021  Room and Bed Number -  0128/0128-01   Hospital Day - 3    SUBJECTIVE:   HISTORY OF PRESENT ILLNESS:    Greg Dooley is a 42 y.o. with PMH of drug use and abuse, anxiety and depression. Presented to ER after being found down for unknown amount of time, altered. Patient given 16 mg of narcan at outlying facility and ultimately intubated for airway protection. Upon admission, pt was intubated, sedated, moving all extremities on sedation holiday, normotensive, tachycardic, and febrile. Admission ABG showed pH of 7.35, PCO2 of 46, HCO3 of 27 . Labs showed leukocytosis,  normal electrolites, normal kidney function tests, and normal LFT. Patient admitted to the ICU for further workup and management     OVERNIGHT EVENTS:      No acute events overnight. Patient extubated yesterday. Patient weaned off of Precedex and propofol. Patient given Seroquel. Patient passed swallow study. ANO x4, follows commands.     TODAY:     AWAKE & FOLLOWING COMMANDS: []   No  [x]   Yes                   SECRETIONS             Amount:  [x]   None []  Small []  Moderate []  Large    Color: []   White []   Colored []   Bloody                 SEDATION:             RAAS Score: [x]   +1 to -1 []   -2 []   -3 []   -4    Sedation Agent: [x]   None []   Propofol  gtt  []   Versed []       Paralytic Agent: [x]   None []  Nimbex []  Norcuron [] [place]               VASOPRESSORS:  [x]   No  []   Yes        Vasopressor Agent []   Levophed []   Vasopressin []   Phenylephrine  []   Epinephrine     OBJECTIVE:   VITAL SIGNS:  Patient Vitals for the past 8 hrs:   BP Temp Temp src Pulse Resp SpO2 Weight   09/28/21 0700 94/66 -- -- 64 14 96 % --   09/28/21 0609 -- 98.8 °F (37.1 °C) Oral -- -- -- --   09/28/21 0600 100/70 -- -- 66 15 95 % --   09/28/21 0500 108/68 -- -- 70 17 95 % --   09/28/21 0404 -- PRN  sodium chloride flush, 5-40 mL, PRN  sodium chloride, 25 mL, PRN  ondansetron, 4 mg, Q8H PRN   Or  ondansetron, 4 mg, Q6H PRN  polyethylene glycol, 17 g, Daily PRN  acetaminophen, 650 mg, Q6H PRN   Or  acetaminophen, 650 mg, Q6H PRN        SUPPORT DEVICES:   [] Ventilator [] BIPAP   [] HiFlo Nasal Cannula  [] Nasal Cannula   [x] Room Air  VENT SETTINGS (Comprehensive) (if applicable):  Vent Information  $Ventilation: $Subsequent Day  Skin Assessment: Clean, dry, & intact  Suction Catheter Diameter: 14  Equipment ID: [id number]  Equipment Changed: Expiratory Filter (and HME)  Vent Type: Servo i  Vent Mode: (S) CPAP  Vt Ordered: 530 mL  Rate Set: 16 bmp  Pressure Support: 6 cmH20  FiO2 : 2 %  SpO2: 96 %  SpO2/FiO2 ratio: 4900  Sensitivity: 5  PEEP/CPAP: 5  I Time/ I Time %: 0.9 s  Humidification Source: HME  Nitric Oxide/Epoprostenol In Use?: No  Additional Respiratory  Assessments  Pulse: 64  Resp: 14  SpO2: 96 %  End Tidal CO2: 40 (%)  Position: Semi-Rucker's  Humidification Source: HME  Oral Care Completed?: Yes  Oral Care: Mouthwash, Mouth swabbed, Mouth suctioned  Subglottic Suction Done?: No  Cuff Pressure (cm H2O):  (MLT)  Skin barrier applied: Yes  Lab Results   Component Value Date    MODE NOT REPORTED 09/27/2021       ABGs:   Arterial Blood Gas result:  pH 7.465; pCO2 33.9; pO2 116.2; HCO3 24.4;  FiO2 30, P/F: 387    DATA:  Complete Blood Count:   Recent Labs     09/26/21  0626 09/27/21  0214 09/28/21  0350   WBC 9.1 10.2 9.7   RBC 4.97 5.08 4.92   HGB 13.8 14.3 13.8   HCT 42.3 47.8 42.0   MCV 85.1 94.1 85.4   MCH 27.8 28.1 28.0   MCHC 32.6 29.9 32.9   RDW 13.2 12.9 12.6    139 210   MPV 9.5 9.7 9.7        PT/INR:  No results found for: PROTIME, INR  PTT:  No results found for: APTT, PTT    Basic Metabolic Profile:   Recent Labs     09/26/21  0626 09/27/21  0214 09/28/21  0350   * 135 140   K 3.4* 4.5 3.9    107 106   CO2 20 18* 22   BUN 7 6 6   CREATININE 0.81 0.70 0.80   GLUCOSE

## 2021-10-01 LAB
CULTURE: NORMAL
CULTURE: NORMAL
Lab: NORMAL
Lab: NORMAL
SPECIMEN DESCRIPTION: NORMAL
SPECIMEN DESCRIPTION: NORMAL

## 2022-04-28 ENCOUNTER — HOSPITAL ENCOUNTER (EMERGENCY)
Age: 42
Discharge: HOME | End: 2022-04-29
Attending: EMERGENCY MEDICINE
Payer: COMMERCIAL

## 2022-04-28 VITALS
TEMPERATURE: 97.5 F | DIASTOLIC BLOOD PRESSURE: 76 MMHG | SYSTOLIC BLOOD PRESSURE: 128 MMHG | RESPIRATION RATE: 20 BRPM | OXYGEN SATURATION: 98 % | HEART RATE: 78 BPM

## 2022-04-28 DIAGNOSIS — T14.8XXA SKIN ABRASION: ICD-10-CM

## 2022-04-28 DIAGNOSIS — M54.50 ACUTE MIDLINE LOW BACK PAIN WITHOUT SCIATICA: Primary | ICD-10-CM

## 2022-04-28 PROCEDURE — 99283 EMERGENCY DEPT VISIT LOW MDM: CPT

## 2022-04-28 ASSESSMENT — PAIN - FUNCTIONAL ASSESSMENT
PAIN_FUNCTIONAL_ASSESSMENT: PREVENTS OR INTERFERES SOME ACTIVE ACTIVITIES AND ADLS
PAIN_FUNCTIONAL_ASSESSMENT: 0-10

## 2022-04-28 ASSESSMENT — PAIN DESCRIPTION - ONSET: ONSET: SUDDEN

## 2022-04-28 ASSESSMENT — PAIN SCALES - GENERAL: PAINLEVEL_OUTOF10: 7

## 2022-04-28 ASSESSMENT — PAIN DESCRIPTION - DESCRIPTORS: DESCRIPTORS: BURNING;SHARP

## 2022-04-28 ASSESSMENT — PAIN DESCRIPTION - ORIENTATION: ORIENTATION: MID;LOWER

## 2022-04-28 ASSESSMENT — PAIN DESCRIPTION - PAIN TYPE: TYPE: ACUTE PAIN

## 2022-04-28 ASSESSMENT — PAIN DESCRIPTION - FREQUENCY: FREQUENCY: CONTINUOUS

## 2022-04-28 ASSESSMENT — PAIN DESCRIPTION - LOCATION: LOCATION: BACK

## 2022-04-28 NOTE — Clinical Note
Odette Berry was seen and treated in our emergency department on 4/28/2022. He may return to work on 05/02/2022. If you have any questions or concerns, please don't hesitate to call.       Wen Dior MD

## 2022-04-29 PROCEDURE — 6370000000 HC RX 637 (ALT 250 FOR IP): Performed by: EMERGENCY MEDICINE

## 2022-04-29 RX ORDER — GABAPENTIN 400 MG/1
800 CAPSULE ORAL ONCE
Status: COMPLETED | OUTPATIENT
Start: 2022-04-29 | End: 2022-04-29

## 2022-04-29 RX ADMIN — GABAPENTIN 800 MG: 400 CAPSULE ORAL at 00:47

## 2022-04-29 NOTE — ED PROVIDER NOTES
[facility] ED  [facility] [address]   Chief Complaint   Patient presents with    Back Pain     stuck between 2 pieces of wood at work at approx 2210 this evening; pressure being applied from both sides; abrasion, redness and swelling noted to lower back approx 4 inches above coccyx        HPI   Matthias Bhardwaj is a 36 y.o. male who presents with back pain. The context is that he was squished between some wood pallets at work when someone forgot to turn off a machine. Onset was just prior to presentation. The duration has been since the onset. The pain is moderate. The location of the pain is the lumbar region of the back. The pain worsens with movement of the back (bending and twisting). No associated neurologic symptoms. REVIEW OF SYSTEMS   Musculoskeletal: +back pain  : No dysuria or hematuria  GI: No abdominal pain or vomiting  General: No fevers or chills  Neurologic: No bowel or bladder incontinence, No saddle anesthesia, No leg weakness  Review of systems otherwise negative. PAST MEDICAL & SURGICAL HISTORY   Past Medical History:   Diagnosis Date    Anxiety     Drug abuse ([facility] [address]     Restless leg      History reviewed. No pertinent surgical history. CURRENT MEDICATIONS   Current Outpatient Rx   Medication Sig Dispense Refill    ibuprofen (IBU) 600 MG tablet Take 1 tablet by mouth every 6 hours as needed for Pain (Patient not taking: Reported on 4/28/2022) 30 tablet 0    buPROPion (WELLBUTRIN SR) 150 MG extended release tablet Take 1 tablet by mouth 2 times daily 14 tablet 0    gabapentin (NEURONTIN) 800 MG tablet Take 1 tablet by mouth three times daily for 14 days. . 21 tablet 0      ALLERGIES   No Known Allergies   SOCIAL HISTORY   Social History     Socioeconomic History    Marital status: Single     Spouse name: None    Number of children: None    Years of education: None    Highest education level: None   Occupational History    None   Tobacco Use  Smoking status: Current Every Day Smoker     Packs/day: 0.50     Years: 10.00     Pack years: 5.00     Types: Cigarettes    Smokeless tobacco: Former User   Vaping Use    Vaping Use: Never used   Substance and Sexual Activity    Alcohol use: No    Drug use: No     Comment: Clean since June 2016    Sexual activity: None   Other Topics Concern    None   Social History Narrative    None     Social Determinants of Health     Financial Resource Strain:     Difficulty of Paying Living Expenses: Not on file   Food Insecurity:     Worried About Running Out of Food in the Last Year: Not on file    Michelle of Food in the Last Year: Not on file   Transportation Needs:     Lack of Transportation (Medical): Not on file    Lack of Transportation (Non-Medical):  Not on file   Physical Activity:     Days of Exercise per Week: Not on file    Minutes of Exercise per Session: Not on file   Stress:     Feeling of Stress : Not on file   Social Connections:     Frequency of Communication with Friends and Family: Not on file    Frequency of Social Gatherings with Friends and Family: Not on file    Attends Amish Services: Not on file    Active Member of [address] [place] [address] or Organizations: Not on file    Attends Club or Organization Meetings: Not on file    Marital Status: Not on file   Intimate Partner Violence:     Fear of Current or Ex-Partner: Not on file    Emotionally Abused: Not on file    Physically Abused: Not on file    Sexually Abused: Not on file   Housing Stability:     Unable to Pay for Housing in the Last Year: Not on file    Number of [place] in the Last Year: Not on file    Unstable Housing in the Last Year: Not on file        PHYSICAL EXAM   VITAL SIGNS: /76   Pulse 78   Temp 97.5 °F (36.4 °C) (Tympanic)   Resp 20   SpO2 98%    Constitutional: Well developed, well nourished  HENT: Atraumatic, Moist mucus membranes  Neck: No JVD, supple   Respiratory: No respiratory distress, normal breath sounds   Cardiovascular: Normal rate, no murmurs  Skin: abrasion  GI: Soft, nontender, no pulsitile masses  Musculoskeletal: No edema, no acute deformities  Back- +Paralumbar tenderness   Vascular: DP pulses 2+ equal bilaterally  Neurologic: L5/S1 Motor 5/5 bilaterally, Patella reflexes 2+ bilaterally     PROCEDURES   None    ED COURSE & MEDICAL DECISION MAKING   Pertinent Labs & Imaging studies reviewed and interpreted. (See chart for details)   Differential Diagnosis: Epidural Abscess, Abdominal Aortic Aneurysm, Metastases to back, Cauda Equina Syndrome     MDM: Patient well-appearing. He does not seem to have sustained any serious injury from his accident but he will probably be sore for several days. Work note is written for    FINAL IMPRESSION   1. Acute midline low back pain without sciatica    2.  Skin abrasion        PLAN:   Outpatient treatment, follow-up, and discharge instructions (see EMR)      Electronically signed by: Sasha Whyte MD, 4/29/2022 12:36 AM            Sasha Whyte MD  04/29/22 0729

## 2023-09-06 ENCOUNTER — HOSPITAL ENCOUNTER (EMERGENCY)
Age: 43
Discharge: HOME | End: 2023-09-06
Attending: FAMILY MEDICINE
Payer: COMMERCIAL

## 2023-09-06 VITALS
RESPIRATION RATE: 18 BRPM | TEMPERATURE: 98.3 F | WEIGHT: 170 LBS | SYSTOLIC BLOOD PRESSURE: 115 MMHG | HEART RATE: 87 BPM | BODY MASS INDEX: 23.8 KG/M2 | OXYGEN SATURATION: 94 % | HEIGHT: 71 IN | DIASTOLIC BLOOD PRESSURE: 85 MMHG

## 2023-09-06 DIAGNOSIS — U07.1 COVID-19: Primary | ICD-10-CM

## 2023-09-06 DIAGNOSIS — F19.11 HISTORY OF DRUG ABUSE (HCC): ICD-10-CM

## 2023-09-06 LAB
SARS-COV-2 RDRP RESP QL NAA+PROBE: DETECTED
SPECIMEN DESCRIPTION: ABNORMAL

## 2023-09-06 PROCEDURE — 87635 SARS-COV-2 COVID-19 AMP PRB: CPT

## 2023-09-06 PROCEDURE — C9803 HOPD COVID-19 SPEC COLLECT: HCPCS

## 2023-09-06 PROCEDURE — 99283 EMERGENCY DEPT VISIT LOW MDM: CPT

## 2023-09-06 ASSESSMENT — LIFESTYLE VARIABLES
HOW OFTEN DO YOU HAVE A DRINK CONTAINING ALCOHOL: NEVER
HOW MANY STANDARD DRINKS CONTAINING ALCOHOL DO YOU HAVE ON A TYPICAL DAY: PATIENT DOES NOT DRINK

## 2023-09-06 ASSESSMENT — PAIN - FUNCTIONAL ASSESSMENT: PAIN_FUNCTIONAL_ASSESSMENT: NONE - DENIES PAIN

## 2023-09-07 NOTE — ED PROVIDER NOTES
[address]      Pt Name: Heriberto Rosen  MRN: [id number]  [facility] [place] 1980  Date of evaluation: 9/6/2023  Provider: Lacey Quintero MD    CHIEF COMPLAINT       Chief Complaint   Patient presents with    Other     Medical clearance          HISTORY OF PRESENT ILLNESS      Heriberto Rosen is a 39 y.o. male who presents to the emergency department via police escort, patient been found in his vehicle earlier that day unresponsive, the business had contacted police who in turn contacted EMS for a wellness check, the patient awoke on his own and did not require Narcan. Patient admits to fentanyl use earlier today. As patient had a warrant, he was supposed to go to long-term, brought in the emergency room for medical clearance, patient advised that he is self tested for COVID yesterday and was found to be positive. As the long-term may not be able to take a COVID-positive patient,  advised they needed COVID testing. REVIEW OF SYSTEMS       Review of Systems   All other systems reviewed and are negative. PAST MEDICAL HISTORY     Past Medical History:   Diagnosis Date    Anxiety     Drug abuse ([address])     Restless leg          SURGICAL HISTORY       History reviewed. No pertinent surgical history. CURRENT MEDICATIONS       Discharge Medication List as of 9/6/2023  5:53 PM        CONTINUE these medications which have NOT CHANGED    Details   ibuprofen (IBU) 600 MG tablet Take 1 tablet by mouth every 6 hours as needed for Pain, Disp-30 tablet, R-0Print      buPROPion (WELLBUTRIN SR) 150 MG extended release tablet Take 1 tablet by mouth 2 times daily, Disp-14 tablet, R-0Normal      gabapentin (NEURONTIN) 800 MG tablet Take 1 tablet by mouth three times daily for 14 days. ., Disp-21 tablet, R-0Print             ALLERGIES       Patient has no known allergies. FAMILY HISTORY       History reviewed. No pertinent family history.        SOCIAL HISTORY

## 2024-01-22 NOTE — PROGRESS NOTES
Order obtained for extubation per Dr. Jamel Grace. Patient extubated in normal fashion. Patient placed on 4L NC. SpO2 97%. Positive productive cough. Will continue to monitor. Yes

## 2024-04-13 ENCOUNTER — HOSPITAL ENCOUNTER (OUTPATIENT)
Age: 44
Discharge: HOME | End: 2024-04-13

## 2025-01-08 ENCOUNTER — HOSPITAL ENCOUNTER (EMERGENCY)
Age: 45
Discharge: HOME | End: 2025-01-08
Attending: EMERGENCY MEDICINE
Payer: COMMERCIAL

## 2025-01-08 VITALS
HEIGHT: 71 IN | WEIGHT: 192.1 LBS | HEART RATE: 92 BPM | DIASTOLIC BLOOD PRESSURE: 92 MMHG | OXYGEN SATURATION: 98 % | SYSTOLIC BLOOD PRESSURE: 135 MMHG | RESPIRATION RATE: 16 BRPM | BODY MASS INDEX: 26.9 KG/M2 | TEMPERATURE: 98.8 F

## 2025-01-08 DIAGNOSIS — J02.9 VIRAL PHARYNGITIS: Primary | ICD-10-CM

## 2025-01-08 LAB
SPECIMEN SOURCE: NORMAL
STREP A, MOLECULAR: NEGATIVE

## 2025-01-08 PROCEDURE — 99283 EMERGENCY DEPT VISIT LOW MDM: CPT

## 2025-01-08 PROCEDURE — 87651 STREP A DNA AMP PROBE: CPT

## 2025-01-08 RX ORDER — BUPRENORPHINE AND NALOXONE 8; 2 MG/1; MG/1
1 FILM, SOLUBLE BUCCAL; SUBLINGUAL DAILY
COMMUNITY

## 2025-01-08 ASSESSMENT — ENCOUNTER SYMPTOMS
CHEST TIGHTNESS: 0
NAUSEA: 0
SINUS PRESSURE: 0
EYE REDNESS: 0
COLOR CHANGE: 0
CONSTIPATION: 0
RHINORRHEA: 0
WHEEZING: 0
COUGH: 0
TROUBLE SWALLOWING: 1
ABDOMINAL PAIN: 0
VOMITING: 0
BLOOD IN STOOL: 0
SORE THROAT: 1
EYE PAIN: 0
DIARRHEA: 0
SHORTNESS OF BREATH: 0
BACK PAIN: 0
EYE DISCHARGE: 0
FACIAL SWELLING: 0

## 2025-01-08 ASSESSMENT — PAIN DESCRIPTION - DESCRIPTORS: DESCRIPTORS: ACHING

## 2025-01-08 ASSESSMENT — LIFESTYLE VARIABLES
HOW MANY STANDARD DRINKS CONTAINING ALCOHOL DO YOU HAVE ON A TYPICAL DAY: PATIENT DOES NOT DRINK
HOW OFTEN DO YOU HAVE A DRINK CONTAINING ALCOHOL: NEVER

## 2025-01-08 ASSESSMENT — PAIN SCALES - GENERAL: PAINLEVEL_OUTOF10: 6

## 2025-01-08 ASSESSMENT — PAIN DESCRIPTION - PAIN TYPE: TYPE: ACUTE PAIN

## 2025-01-08 ASSESSMENT — PAIN - FUNCTIONAL ASSESSMENT: PAIN_FUNCTIONAL_ASSESSMENT: 0-10

## 2025-01-08 ASSESSMENT — PAIN DESCRIPTION - LOCATION: LOCATION: THROAT

## 2025-01-08 NOTE — ED PROVIDER NOTES
SCREEN       Vitals Reviewed:    Vitals:    01/08/25 1631   BP: (!) 135/92   Pulse: 92   Resp: 16   Temp: 98.8 °F (37.1 °C)   TempSrc: Oral   SpO2: 98%   Weight: 87.1 kg (192 lb 1.6 oz)   Height: 1.803 m (5' 11\")     MEDICATIONS GIVEN TO PATIENT THIS ENCOUNTER:  No orders of the defined types were placed in this encounter.    DISCHARGE PRESCRIPTIONS:  Current Discharge Medication List        PHYSICIAN CONSULTS ORDERED THIS ENCOUNTER:  None    FINAL IMPRESSION      1. Viral pharyngitis          DISPOSITION/PLAN   DISPOSITION Decision To Discharge 01/08/2025 05:04:29 PM   DISPOSITION CONDITION Stable           PATIENT REFERREDTO:  King Ervin MD  [address] [place] [address]  [phone]    In 1 week      [facility]  Emergency Department  [address] [address]  [phone]    If symptoms worsen      DISCHARGEMEDICATIONS:  Current Discharge Medication List          (Please note that portions of this note were completed with a voice recognition program.  Efforts were made to edit thedictations but occasionally words are mis-transcribed.)    Aaron Calabrese MD  Attending Emergency Physician                        Aaron Calabrese MD  01/08/25 5895

## 2025-06-28 ENCOUNTER — HOSPITAL ENCOUNTER (EMERGENCY)
Age: 45
Discharge: HOME | End: 2025-06-28
Payer: COMMERCIAL

## 2025-06-28 VITALS
OXYGEN SATURATION: 96 % | WEIGHT: 159.4 LBS | RESPIRATION RATE: 18 BRPM | BODY MASS INDEX: 22.31 KG/M2 | TEMPERATURE: 98.2 F | SYSTOLIC BLOOD PRESSURE: 120 MMHG | HEIGHT: 71 IN | DIASTOLIC BLOOD PRESSURE: 78 MMHG | HEART RATE: 80 BPM

## 2025-06-28 DIAGNOSIS — Z00.00 WELLNESS EXAMINATION: Primary | ICD-10-CM

## 2025-06-28 LAB
CHP ED QC CHECK: NORMAL
GLUCOSE BLD-MCNC: 90 MG/DL
GLUCOSE BLD-MCNC: 90 MG/DL (ref 65–99)

## 2025-06-28 PROCEDURE — 82947 ASSAY GLUCOSE BLOOD QUANT: CPT

## 2025-06-28 PROCEDURE — 99283 EMERGENCY DEPT VISIT LOW MDM: CPT

## 2025-06-28 RX ORDER — ZOLPIDEM TARTRATE 10 MG/1
10 TABLET ORAL
COMMUNITY

## 2025-06-28 NOTE — PROGRESS NOTES
Patient states he does not want to stay any longer. States he is going to walk to his brothers house which is in town. Patient cooperative and allows RN to remove IV. Provided with discharge paperwork.

## 2025-06-28 NOTE — ED PROVIDER NOTES
[facility]  EMERGENCY DEPARTMENT ENCOUNTER      Pt Name: Aidan Ward  MRN: [id number]  Birthdate 1980  Date of evaluation: 6/28/2025  Provider: Yared Ribera MD    CHIEF COMPLAINT       Chief Complaint   Patient presents with    OTHER     Pt arrives via [facility], was found in gas station parking lot slumped over wheel of car. Patient alert upon arrival, states he hasn't slept well in a few days.       HISTORY OF PRESENT ILLNESS      Aidan Ward is a 45 y.o., male ,  has a past medical history of Anxiety, Drug abuse (HCC), and Restless leg. who was evaluated in the emergency room for wellness exam.  Patient was found slumped over in his car, parked at a gas station, bystander called EMS, EMS arrived to patient being somnolent, he was easily arousable, he was given 1 mg of Narcan after which he was more alert, able to answer questions.  Upon arrival to the emergency room patient is alert and oriented x 4, says he was kicked out of the [facility] drug rehab for not being back to the facility in time.   Says he was not using any drugs, he is on suboxone therapy and ran out of his medication few days ago.     Per review of his medications, patient's suboxone was prescribed and the medication should have ran out 5 days ago.     Other medications includeOTHER (Pt arrives via [facility], was found in gas station parking lot slumped over wheel of car. Patient alert upon arrival, states he hasn't slept well in a few days.)          REVIEW OF SYSTEMS       Review of system: Negative except for what is mentioned in the HPI.      PAST MEDICAL HISTORY     Past Medical History:   Diagnosis Date    Anxiety     Drug abuse (HCC)     Restless leg        SURGICAL HISTORY       History reviewed. No pertinent surgical history.    CURRENT MEDICATIONS       Previous Medications    BUPRENORPHINE-NALOXONE (SUBOXONE) 8-2 MG FILM SL FILM    Place 1 Film under the tongue daily.    BUPROPION (WELLBUTRIN SR) 150 MG EXTENDED RELEASE

## 2025-06-28 NOTE — PROGRESS NOTES
Patient reports he was kicked out of the [facility] yesterday. Has a car he was sleeping in that was parked at [employer]. Denies any drug use and does not have complaints at this time. Patient able to answer questions appropriately. Offered to call contacts but patient denies.